# Patient Record
Sex: FEMALE | Race: WHITE | Employment: STUDENT | URBAN - METROPOLITAN AREA
[De-identification: names, ages, dates, MRNs, and addresses within clinical notes are randomized per-mention and may not be internally consistent; named-entity substitution may affect disease eponyms.]

---
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
## 2022-08-05 ENCOUNTER — OFFICE VISIT (OUTPATIENT)
Dept: INTERNAL MEDICINE CLINIC | Facility: CLINIC | Age: 22
End: 2022-08-05
Payer: COMMERCIAL

## 2022-08-05 VITALS
HEIGHT: 62.6 IN | DIASTOLIC BLOOD PRESSURE: 64 MMHG | RESPIRATION RATE: 16 BRPM | BODY MASS INDEX: 22.18 KG/M2 | WEIGHT: 123.63 LBS | OXYGEN SATURATION: 98 % | SYSTOLIC BLOOD PRESSURE: 102 MMHG | TEMPERATURE: 97 F | HEART RATE: 85 BPM

## 2022-08-05 DIAGNOSIS — Z85.43: ICD-10-CM

## 2022-08-05 DIAGNOSIS — F41.9 ANXIETY: ICD-10-CM

## 2022-08-05 DIAGNOSIS — Z15.09 MONOALLELIC MUTATION OF DICER1 GENE: ICD-10-CM

## 2022-08-05 DIAGNOSIS — Z00.00 ROUTINE GENERAL MEDICAL EXAMINATION AT A HEALTH CARE FACILITY: Primary | ICD-10-CM

## 2022-08-05 DIAGNOSIS — I45.81 LONG Q-T SYNDROME: ICD-10-CM

## 2022-08-05 DIAGNOSIS — Z15.89 MONOALLELIC MUTATION OF DICER1 GENE: ICD-10-CM

## 2022-08-05 PROBLEM — D27.0: Status: ACTIVE | Noted: 2017-09-23

## 2022-08-05 PROBLEM — E04.2 MULTIPLE THYROID NODULES: Status: ACTIVE | Noted: 2017-10-27

## 2022-08-05 PROCEDURE — 3078F DIAST BP <80 MM HG: CPT | Performed by: INTERNAL MEDICINE

## 2022-08-05 PROCEDURE — 3008F BODY MASS INDEX DOCD: CPT | Performed by: INTERNAL MEDICINE

## 2022-08-05 PROCEDURE — 3074F SYST BP LT 130 MM HG: CPT | Performed by: INTERNAL MEDICINE

## 2022-08-05 PROCEDURE — 99385 PREV VISIT NEW AGE 18-39: CPT | Performed by: INTERNAL MEDICINE

## 2022-08-08 ENCOUNTER — LAB REQUISITION (OUTPATIENT)
Dept: LAB | Facility: HOSPITAL | Age: 22
End: 2022-08-08
Payer: COMMERCIAL

## 2022-08-08 DIAGNOSIS — Z11.3 ENCOUNTER FOR SCREENING FOR INFECTIONS WITH A PREDOMINANTLY SEXUAL MODE OF TRANSMISSION: ICD-10-CM

## 2022-08-08 PROCEDURE — 87491 CHLMYD TRACH DNA AMP PROBE: CPT | Performed by: OBSTETRICS & GYNECOLOGY

## 2022-08-08 PROCEDURE — 87591 N.GONORRHOEAE DNA AMP PROB: CPT | Performed by: OBSTETRICS & GYNECOLOGY

## 2022-08-08 PROCEDURE — 87661 TRICHOMONAS VAGINALIS AMPLIF: CPT | Performed by: OBSTETRICS & GYNECOLOGY

## 2022-08-09 LAB
C TRACH DNA SPEC QL NAA+PROBE: NEGATIVE
N GONORRHOEA DNA SPEC QL NAA+PROBE: NEGATIVE

## 2022-08-11 LAB — TRICHOMONAS VAGINALIS BY TMA: NEGATIVE

## 2023-09-20 RX ORDER — NORETHINDRONE ACETATE AND ETHINYL ESTRADIOL AND FERROUS FUMARATE 1MG-20(21)
1 KIT ORAL DAILY
Qty: 84 TABLET | Refills: 0 | Status: SHIPPED | OUTPATIENT
Start: 2023-09-20

## 2023-10-03 ENCOUNTER — TELEPHONE (OUTPATIENT)
Dept: INTERNAL MEDICINE CLINIC | Facility: CLINIC | Age: 23
End: 2023-10-03

## 2023-10-03 DIAGNOSIS — Z13.29 SCREENING FOR THYROID DISORDER: ICD-10-CM

## 2023-10-03 DIAGNOSIS — Z13.228 SCREENING FOR METABOLIC DISORDER: ICD-10-CM

## 2023-10-03 DIAGNOSIS — Z13.0 SCREENING FOR BLOOD DISEASE: ICD-10-CM

## 2023-10-03 DIAGNOSIS — Z13.220 SCREENING FOR LIPID DISORDERS: ICD-10-CM

## 2023-10-03 DIAGNOSIS — Z00.00 ROUTINE GENERAL MEDICAL EXAMINATION AT A HEALTH CARE FACILITY: Primary | ICD-10-CM

## 2023-10-03 NOTE — TELEPHONE ENCOUNTER
Orders to    Imamnuel Sargent           Pt aware to get labs done no sooner than 2 weeks prior to the appt.   Pt aware to fast.  No call back required./    Future Appointments   Date Time Provider Manuela York   12/28/2023  1:20 PM Collin Felipe MD [facility]

## 2023-10-03 NOTE — TELEPHONE ENCOUNTER
Orders to    [facility]      Pt aware to get labs done no sooner than 2 weeks prior to the appt. Pt aware to fast.  No call back required.   Future Appointments   Date Time Provider Manuela York   12/28/2023  1:20 PM Demetrio Delaney MD [facility]

## 2023-12-23 ENCOUNTER — LAB ENCOUNTER (OUTPATIENT)
Dept: LAB | Age: 23
End: 2023-12-23
Attending: INTERNAL MEDICINE
Payer: COMMERCIAL

## 2023-12-23 LAB
ALBUMIN SERPL-MCNC: 4.3 G/DL (ref 3.2–4.8)
ALBUMIN/GLOB SERPL: 1.7 {RATIO} (ref 1–2)
ALP LIVER SERPL-CCNC: 56 U/L
ALT SERPL-CCNC: 74 U/L
ANION GAP SERPL CALC-SCNC: 10 MMOL/L (ref 0–18)
AST SERPL-CCNC: 35 U/L (ref ?–34)
BASOPHILS # BLD AUTO: 0.06 X10(3) UL (ref 0–0.2)
BASOPHILS NFR BLD AUTO: 0.8 %
BILIRUB SERPL-MCNC: 0.4 MG/DL (ref 0.3–1.2)
BUN BLD-MCNC: 8 MG/DL (ref 9–23)
BUN/CREAT SERPL: 11.9 (ref 10–20)
CALCIUM BLD-MCNC: 9.4 MG/DL (ref 8.7–10.4)
CHLORIDE SERPL-SCNC: 103 MMOL/L (ref 98–112)
CHOLEST SERPL-MCNC: 132 MG/DL (ref ?–200)
CO2 SERPL-SCNC: 26 MMOL/L (ref 21–32)
CREAT BLD-MCNC: 0.67 MG/DL
DEPRECATED RDW RBC AUTO: 40.7 FL (ref 35.1–46.3)
EGFRCR SERPLBLD CKD-EPI 2021: 127 ML/MIN/1.73M2 (ref 60–?)
EOSINOPHIL # BLD AUTO: 0.28 X10(3) UL (ref 0–0.7)
EOSINOPHIL NFR BLD AUTO: 3.8 %
ERYTHROCYTE [DISTWIDTH] IN BLOOD BY AUTOMATED COUNT: 12.4 % (ref 11–15)
FASTING PATIENT LIPID ANSWER: NO
FASTING STATUS PATIENT QL REPORTED: NO
GLOBULIN PLAS-MCNC: 2.6 G/DL (ref 2.8–4.4)
GLUCOSE BLD-MCNC: 87 MG/DL (ref 70–99)
HCT VFR BLD AUTO: 42.3 %
HDLC SERPL-MCNC: 51 MG/DL (ref 40–59)
HGB BLD-MCNC: 14.7 G/DL
IMM GRANULOCYTES # BLD AUTO: 0.01 X10(3) UL (ref 0–1)
IMM GRANULOCYTES NFR BLD: 0.1 %
LDLC SERPL CALC-MCNC: 58 MG/DL (ref ?–100)
LYMPHOCYTES # BLD AUTO: 1.98 X10(3) UL (ref 1–4)
LYMPHOCYTES NFR BLD AUTO: 26.6 %
MCH RBC QN AUTO: 31.4 PG (ref 26–34)
MCHC RBC AUTO-ENTMCNC: 34.8 G/DL (ref 31–37)
MCV RBC AUTO: 90.4 FL
MONOCYTES # BLD AUTO: 0.58 X10(3) UL (ref 0.1–1)
MONOCYTES NFR BLD AUTO: 7.8 %
NEUTROPHILS # BLD AUTO: 4.54 X10 (3) UL (ref 1.5–7.7)
NEUTROPHILS # BLD AUTO: 4.54 X10(3) UL (ref 1.5–7.7)
NEUTROPHILS NFR BLD AUTO: 60.9 %
NONHDLC SERPL-MCNC: 81 MG/DL (ref ?–130)
OSMOLALITY SERPL CALC.SUM OF ELEC: 286 MOSM/KG (ref 275–295)
PLATELET # BLD AUTO: 291 10(3)UL (ref 150–450)
POTASSIUM SERPL-SCNC: 4.3 MMOL/L (ref 3.5–5.1)
PROT SERPL-MCNC: 6.9 G/DL (ref 5.7–8.2)
RBC # BLD AUTO: 4.68 X10(6)UL
SODIUM SERPL-SCNC: 139 MMOL/L (ref 136–145)
TRIGL SERPL-MCNC: 130 MG/DL (ref 30–149)
TSI SER-ACNC: 1.01 MIU/ML (ref 0.55–4.78)
VLDLC SERPL CALC-MCNC: 19 MG/DL (ref 0–30)
WBC # BLD AUTO: 7.5 X10(3) UL (ref 4–11)

## 2023-12-23 PROCEDURE — 85025 COMPLETE CBC W/AUTO DIFF WBC: CPT | Performed by: INTERNAL MEDICINE

## 2023-12-23 PROCEDURE — 84443 ASSAY THYROID STIM HORMONE: CPT | Performed by: INTERNAL MEDICINE

## 2023-12-23 PROCEDURE — 80061 LIPID PANEL: CPT | Performed by: INTERNAL MEDICINE

## 2023-12-23 PROCEDURE — 80053 COMPREHEN METABOLIC PANEL: CPT | Performed by: INTERNAL MEDICINE

## 2023-12-23 PROCEDURE — 36415 COLL VENOUS BLD VENIPUNCTURE: CPT | Performed by: INTERNAL MEDICINE

## 2023-12-28 ENCOUNTER — OFFICE VISIT (OUTPATIENT)
Dept: INTERNAL MEDICINE CLINIC | Facility: CLINIC | Age: 23
End: 2023-12-28
Payer: COMMERCIAL

## 2023-12-28 VITALS
SYSTOLIC BLOOD PRESSURE: 104 MMHG | HEART RATE: 67 BPM | BODY MASS INDEX: 23 KG/M2 | DIASTOLIC BLOOD PRESSURE: 58 MMHG | OXYGEN SATURATION: 99 % | WEIGHT: 129 LBS | TEMPERATURE: 98 F

## 2023-12-28 DIAGNOSIS — I45.81 LONG Q-T SYNDROME: ICD-10-CM

## 2023-12-28 DIAGNOSIS — Z00.00 ROUTINE GENERAL MEDICAL EXAMINATION AT A HEALTH CARE FACILITY: Primary | ICD-10-CM

## 2023-12-28 DIAGNOSIS — R74.01 TRANSAMINITIS: ICD-10-CM

## 2023-12-28 DIAGNOSIS — Z23 NEED FOR INFLUENZA VACCINATION: ICD-10-CM

## 2023-12-28 DIAGNOSIS — Z15.89 MONOALLELIC MUTATION OF DICER1 GENE: ICD-10-CM

## 2023-12-28 DIAGNOSIS — Z12.4 PAP SMEAR FOR CERVICAL CANCER SCREENING: ICD-10-CM

## 2023-12-28 DIAGNOSIS — Z85.43: ICD-10-CM

## 2023-12-28 DIAGNOSIS — Z11.8 SCREENING FOR CHLAMYDIAL DISEASE: ICD-10-CM

## 2023-12-28 DIAGNOSIS — R10.10 UPPER ABDOMINAL PAIN: ICD-10-CM

## 2023-12-28 DIAGNOSIS — Z15.09 MONOALLELIC MUTATION OF DICER1 GENE: ICD-10-CM

## 2023-12-28 PROCEDURE — 90471 IMMUNIZATION ADMIN: CPT | Performed by: INTERNAL MEDICINE

## 2023-12-28 PROCEDURE — 87491 CHLMYD TRACH DNA AMP PROBE: CPT | Performed by: INTERNAL MEDICINE

## 2023-12-28 PROCEDURE — 87591 N.GONORRHOEAE DNA AMP PROB: CPT | Performed by: INTERNAL MEDICINE

## 2023-12-28 PROCEDURE — 90686 IIV4 VACC NO PRSV 0.5 ML IM: CPT | Performed by: INTERNAL MEDICINE

## 2023-12-28 PROCEDURE — 99395 PREV VISIT EST AGE 18-39: CPT | Performed by: INTERNAL MEDICINE

## 2023-12-28 PROCEDURE — 3078F DIAST BP <80 MM HG: CPT | Performed by: INTERNAL MEDICINE

## 2023-12-28 PROCEDURE — 3074F SYST BP LT 130 MM HG: CPT | Performed by: INTERNAL MEDICINE

## 2023-12-29 LAB
C TRACH DNA SPEC QL NAA+PROBE: NEGATIVE
N GONORRHOEA DNA SPEC QL NAA+PROBE: NEGATIVE

## 2024-01-26 ENCOUNTER — HOSPITAL ENCOUNTER (OUTPATIENT)
Dept: ULTRASOUND IMAGING | Age: 24
Discharge: HOME | End: 2024-01-26
Attending: INTERNAL MEDICINE
Payer: COMMERCIAL

## 2024-01-26 DIAGNOSIS — R10.10 UPPER ABDOMINAL PAIN: ICD-10-CM

## 2024-01-26 DIAGNOSIS — R74.01 TRANSAMINITIS: ICD-10-CM

## 2024-01-26 PROCEDURE — 76700 US EXAM ABDOM COMPLETE: CPT | Performed by: INTERNAL MEDICINE

## 2024-03-01 ENCOUNTER — OFFICE VISIT (OUTPATIENT)
Facility: CLINIC | Age: 24
End: 2024-03-01
Payer: COMMERCIAL

## 2024-03-01 VITALS
HEART RATE: 67 BPM | SYSTOLIC BLOOD PRESSURE: 112 MMHG | BODY MASS INDEX: 22.72 KG/M2 | HEIGHT: 62.6 IN | WEIGHT: 126.63 LBS | DIASTOLIC BLOOD PRESSURE: 72 MMHG

## 2024-03-01 DIAGNOSIS — Z12.4 SCREENING FOR CERVICAL CANCER: ICD-10-CM

## 2024-03-01 DIAGNOSIS — Z15.09 MONOALLELIC MUTATION OF DICER1 GENE: ICD-10-CM

## 2024-03-01 DIAGNOSIS — Z15.89 MONOALLELIC MUTATION OF DICER1 GENE: ICD-10-CM

## 2024-03-01 DIAGNOSIS — M62.89 PELVIC FLOOR DYSFUNCTION IN FEMALE: ICD-10-CM

## 2024-03-01 DIAGNOSIS — N89.8 VAGINAL DISCHARGE: ICD-10-CM

## 2024-03-01 DIAGNOSIS — F43.9 STRESS: ICD-10-CM

## 2024-03-01 DIAGNOSIS — Z30.09 GENERAL COUNSELING AND ADVICE FOR CONTRACEPTIVE MANAGEMENT: ICD-10-CM

## 2024-03-01 DIAGNOSIS — Z85.43: ICD-10-CM

## 2024-03-01 DIAGNOSIS — Z01.411 ENCOUNTER FOR WELL WOMAN EXAM WITH ABNORMAL FINDINGS: Primary | ICD-10-CM

## 2024-03-01 DIAGNOSIS — R79.89 ELEVATED LFTS: ICD-10-CM

## 2024-03-01 PROBLEM — I45.81 LONG QT SYNDROME: Status: ACTIVE | Noted: 2018-05-17

## 2024-03-01 PROCEDURE — 99385 PREV VISIT NEW AGE 18-39: CPT | Performed by: OBSTETRICS & GYNECOLOGY

## 2024-03-01 PROCEDURE — 99203 OFFICE O/P NEW LOW 30 MIN: CPT | Performed by: OBSTETRICS & GYNECOLOGY

## 2024-03-01 RX ORDER — DROSPIRENONE 4 MG/1
1 TABLET, FILM COATED ORAL DAILY
Qty: 84 TABLET | Refills: 3 | Status: SHIPPED | OUTPATIENT
Start: 2024-03-01 | End: 2024-03-29

## 2024-03-01 NOTE — H&P
[facility]  Obstetrics and Gynecology   History & Physical  NEW    Chief complaint:   Chief Complaint   Patient presents with    Wellness Visit     No pap history  Wants to discuss birth control options        Subjective:     HPI: Yina Horan is a 23 year old  with Patient's last menstrual period was 2024 (exact date).   Here for WWE, discuss contraception options. She would like to minimize her risk of future cancers.      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     Stage 1 - oopherectomy . Nodes clear. No chemo/radiation. 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 Has seen Genetic Counselor     PCP: Adilia Rainey MD     Review of Systems   Constitutional:  Positive for unexpected weight change.        Used to be around 120 lb.   Some stress at school. Some emotional eating.   Sometimes not enough. Sometimes forget.   Working at the [employer] in [employer]  Exercise - not much lately due to working a lot  Diet - unrestricted    Eyes:  Negative for visual disturbance.   Respiratory:  Negative for cough and shortness of breath.    Cardiovascular:         Long QT syndrome- sees cardiology from [facility], doing well on nadolol   Gastrointestinal:  Positive for constipation. Negative for blood in stool and diarrhea.        Occasional constipation since surgery to remove ovary.     Labs with mild elevation in LFTs, pt admits to drinking alcohol for the holidays. Does get episodic upper abdominal pains that resolve on their own, no vomiting but sometimes nausea with the pain    Has been diagnosed with gallstones. Still has gallbladder.    Genitourinary:  Negative for dyspareunia,  dysuria, frequency, hematuria, menstrual problem, pelvic pain and vaginal discharge.        Was on OCP for 2 years but ran out around 2023, no problems on it and would like it to be prescribed again.     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   -usually goes now only 1 time per year    No leaking urine   Has always had more vaginal discharge. No malodor. Occasional itch.     Breasts - no issues.    Neurological:  Negative for headaches.   Psychiatric/Behavioral:  Positive for sleep disturbance. Negative for dysphoric mood. The patient is not nervous/anxious.         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   Therapist - N. Would like to talk to someone.     Tendency to sleep deprivation.   Good sleeper     GYN Hx:   Menarche 16  Irregular menses x moderate flow x 6 days x no cramping.     Started OCP for contraception purposes - around 20 to 21   -Junel Fe - not sure of exact dose. Would not get withdrawal bleed on blanks but would have bad cramping.     Currently not on anything - currently monthly x normal to heavier x moderate cramping. Not needing medicine but does have a higher pain tolerance. Pushes through, can function.     Patient's last menstrual period was 2024 (exact date).  Pap Result Notes: no pap history    Boyfriend 2 years.   Sexually active? Y  Dyspareunia? N  Postcoital bleeding? N    Contraception: abstinent since running out of OCP  STDs: N  HPV vaccine Y    Cervical cancer screening:   Pap N     Breast cancer screening:  Mammogram: N    Colon cancer screening:  Colonoscopy N    OB History:  OB History    Para Term  AB Living   0 0 0 0 0 0   SAB IAB Ectopic Multiple Live Births   0 0 0 0 0     OB History    Para Term  AB Living   0 0 0 0 0 0   SAB  IAB Ectopic Multiple Live Births   0 0 0 0 0       Meds:  Current Outpatient Medications on File Prior to Visit   Medication Sig Dispense Refill    nadolol 20 MG Oral Tab   2     No current facility-administered medications on file prior to visit.       All:  Allergies   Allergen Reactions    Seasonal        PMH:  Past Medical History:   Diagnosis Date    Allergic rhinitis     Anxiety 08/05/2022    Asthma (HCC)     childhood    Bronchial cyst 2004    Dysmenorrhea     Elevated LFTs 03/01/2024    Gallstones     History of primary malignant neoplasm of right ovary 08/05/2022    Long QT syndrome 05/17/2018    Phenotype positive (multiple ECGs with QTc > 470 ms; see August 2018 for an example)  Genotype positive (SCN5A [id number] p.[id number], heterozygous, likely pathologic variant)  Started on beta blockers in August 2018    Lung cyst 01/01/2003    Diagnosed at 3 year of age. Surgically resected at [facility]. Benign per report    Malignant neoplasm of right ovary (HCC) 02/20/2018    Stage 1 - oopherectomy 9/17. Nodes clear. No chemo/radiation.  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 Will do Brain CT in future.  Next follow with [facility] 4/18. 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 Omentum with chronic inflammation, negative for tumor. Pericaval lymph node x 1 negative for tumor. Peritoneal fluid negative for tumor.    Wears glasses     driving, studying       PSH:  Past Surgical History:   Procedure  Laterality Date    FINE NEEDLE ASPIR,SUPERFICIAL Bilateral 04/05/2021    Bilateral thyroid nodules - benign follicular nodules    OOPHORECTOMY Right 2017    Sertoli Leydig cell tumor right ovary    OTHER SURGICAL HISTORY  2004    Removal bronchial cyst Per NG surgery at [facility] by Dr Lee       Social History:  Social History     Socioeconomic History    Marital status: Single   Tobacco Use    Smoking status: Some Days     Types: Cigarettes    Smokeless tobacco: Never    Tobacco comments:     1 cig every few months. Rare.    Vaping Use    Vaping Use: Never used   Substance and Sexual Activity    Alcohol use: Not Currently     Comment: occasional, social situations.    Drug use: Never    Sexual activity: Yes     Partners: Male     Birth control/protection: Condom   Other Topics Concern    Caffeine Concern No    Exercise No    Seat Belt No    Special Diet No    Stress Concern Yes     Comment: College student, and experienced 3 family deaths past 6 freddy    Weight Concern No        Family History:  Family History   Problem Relation Age of Onset    Genetic Disease Mother         Carrier DICER1 mutation    Other (Prolonged QT syndrome) Mother     Thyroid disease Mother         Grave's disease    No Known Problems Father         Not much known about this side of family    Genetic Disease Sister         Carrier DICER1 mutation    Other (Prolonged QT syndrome) Sister     No Known Problems Sister     No Known Problems Brother     Diabetes Maternal Grandfather     Hypertension Maternal Grandfather     Prostate Cancer Maternal Grandfather     Stroke Maternal Grandfather     Ovarian Cancer Maternal Aunt 18    Thyroid Cancer Maternal Aunt         30s    Breast Cancer Other     Breast Cancer Other     Heart Disorder Neg     Lipids Neg     Obesity Neg     Psychiatric Neg     Colon Cancer Neg     Birth Defects Neg     Infertility Neg     Endometriosis Neg     Blood Clotting Disorder Neg        Immunization  History:  Immunization History   Administered Date(s) Administered    Covid-19 Vaccine Pfizer 30 mcg/0.3 ml 07/06/2021, 07/27/2021    DTAP 01/17/2003, 03/28/2003, 05/21/2003, 05/24/2004, 09/13/2007    DTAP INFANRIX 09/13/2007    FLULAVAL 6 months & older 0.5 ml Prefilled syringe (11054) 10/15/2020    FLUZONE 6 months and older PFS 0.5 ml (75971) 01/04/2016, 10/06/2016, 09/26/2017, 09/11/2019, 10/15/2020, 11/22/2021, 12/28/2023    HEP A,Ped/Adol,(2 Dose) 10/06/2016, 02/15/2017    HEP B 01/17/2003, 03/28/2003, 05/24/2004    HIB 01/17/2003, 03/28/2003, 05/24/2004    Hpv Virus Vaccine 9 Kenna Im 02/15/2017, 05/16/2018    IPV 01/17/2003, 03/28/2003, 05/24/2004, 09/13/2007    Influenza 10/18/2011    MMR 11/20/2003, 09/13/2007, 09/13/2007    Meningococcal-Menactra 07/14/2014    Meningococcal-Menveo 2month-55yr 10/15/2020    Pneumococcal Vaccine, Conjugate 01/17/2003, 03/28/2003, 05/21/2003, 05/24/2004    TDAP 07/14/2014    Varicella 11/20/2003, 04/19/2012       Depression Scale      PHQ-2 SCORE: 0  , done 3/1/2024         Objective:     Vitals:    03/01/24 1437   BP: 112/72   Pulse: 67   Weight: 126 lb 9.6 oz (57.4 kg)   Height: 62.6\"       Body mass index is 22.71 kg/m².    Physical Exam:  Physical Exam  Vitals and nursing note reviewed.   Constitutional:       Appearance: Normal appearance.   HENT:      Head: Normocephalic and atraumatic.        Comments: Small erythematous patches. Patient reports has been diagnosed with rosacea  Eyes:      Extraocular Movements: Extraocular movements intact.      Conjunctiva/sclera: Conjunctivae normal.   Neck:      Comments: No thyromegaly  Cardiovascular:      Rate and Rhythm: Normal rate and regular rhythm.      Heart sounds: No murmur heard.  Pulmonary:      Effort: Pulmonary effort is normal.      Breath sounds: Normal breath sounds.      Comments: Breasts: no masses, no axillary lymphadenopathy  Abdominal:      General: There is no distension.      Palpations: Abdomen is soft.  There is no mass.      Tenderness: There is no abdominal tenderness. There is no guarding or rebound.      Hernia: No hernia is present.          Comments: Low midline & periumbilical well healed scar   Genitourinary:     Comments: VULVA: normal appearing vulva with no masses, tenderness or lesions  URETHRA: unremarkable   PERINEUM: intact  VAGINA: normal appearing vagina with normal color and discharge, no lesions   CERVIX: normal appearing cervix without discharge or lesions  UTERUS: uterus is normal size, shape, consistency and nontender  ADNEXA: normal adnexa in size, nontender and no masses  PELVIC FLOOR: mod hypertonicity, mild tenderness       Neurological:      General: No focal deficit present.      Mental Status: She is alert.      Cranial Nerves: No cranial nerve deficit.   Psychiatric:         Mood and Affect: Mood normal.         Behavior: Behavior normal.         Thought Content: Thought content normal.         Judgment: Judgment normal.         Labs:  Lab Results   Component Value Date    WBC 7.5 12/23/2023    RBC 4.68 12/23/2023    HGB 14.7 12/23/2023    HCT 42.3 12/23/2023    MCV 90.4 12/23/2023    MCH 31.4 12/23/2023    MCHC 34.8 12/23/2023    RDW 12.4 12/23/2023    .0 12/23/2023    MPV 7.1 (L) 09/22/2017        Lab Results   Component Value Date    GLU 87 12/23/2023    BUN 8 (L) 12/23/2023    BUNCREA 11.9 12/23/2023    CREATSERUM 0.67 12/23/2023    ANIONGAP 10 12/23/2023    CA 9.4 12/23/2023    OSMOCALC 286 12/23/2023    ALKPHO 56 12/23/2023    AST 35 (H) 12/23/2023    ALT 74 (H) 12/23/2023    BILT 0.4 12/23/2023    TP 6.9 12/23/2023    ALB 4.3 12/23/2023    GLOBULIN 2.6 (L) 12/23/2023     12/23/2023    K 4.3 12/23/2023     12/23/2023    CO2 26.0 12/23/2023       Lab Results   Component Value Date    CHOLEST 132 12/23/2023    TRIG 130 12/23/2023    HDL 51 12/23/2023    LDL 58 12/23/2023    VLDL 19 12/23/2023    NONHDLC 81 12/23/2023        Lab Results   Component Value Date     T4F 0.74 02/15/2017    TSH 1.015 2023        No results found for: \"EAG\", \"A1C\"    Imaging:  US ABDOMEN COMPLETE (CPT=76700)    Result Date: 2024  CONCLUSION:  1. Extensive cholelithiasis without sonographic evidence of acute cholecystitis.  2. Negative for hepatobiliary dilatation.  3. Sonographic features of hepatic steatosis.   4. Lesser incidental findings as above.    Dictated by (CST): Rustam Price MD on 2024 at 1:31 PM     Finalized by (CST): Rustam Price MD on 2024 at 1:33 PM            Assessment:     Yina Horan is a 23 year old  female here for WWE, discuss contraception options. H/o right oophorectomy for Sertoli Leydig tumor. Carrier mutation in DICER1 making her at increased risk for various cancers.      Diagnoses and all orders for this visit:    Encounter for well woman exam with abnormal findings    Screening for cervical cancer  -     ThinPrep PAP with HPV Reflex Request B; Future  -     Image-Guided Pap Smear ([facility])    General counseling and advice for contraceptive management    History of primary malignant neoplasm of right ovary  -     Drospirenone (SLYND) 4 MG Oral Tab; Take 1 tablet by mouth daily for 28 days.    Monoallelic mutation of DICER1 gene  -     Drospirenone (SLYND) 4 MG Oral Tab; Take 1 tablet by mouth daily for 28 days.    Elevated LFTs  -     Drospirenone (SLYND) 4 MG Oral Tab; Take 1 tablet by mouth daily for 28 days.    Vaginal discharge  -     Vaginitis Vaginosis PCR Panel; Future    Pelvic floor dysfunction in female  -     OP REFERRAL TO [facility]    Stress  -      NAVIGATOR         Plan:     Pap done 3/1/2024     Breast exam benign 3/1/24  HPV vaccine done     DICER mutation  -at higher risk of cancers  -reviewed estrogen containing hormonal contraceptives could potentially stimulate an estrogen responsive tumor  -reviewed also that OCPs have been noted to decrease the risk of fallopian tube, ovarian, and  colon cancers.     Contraception   -condoms currently. Interested in OCP. Advise avoiding estrogen given h/o elevated LFTs & gallstones.   -not interested in implant or IUD  -will try progestin only OCP Sly[facility].     STD screening: GC/CT neg 12/28/23 with PCP. Declines testing today. Has been with same partner for 2 years.     Mammogram NA  Colonoscopy NA     BMI normal   -healthy diet & exercise encouraged    Vaginal discharge  -BD molecular swab collected    Stress  - navigator ordered     RTC 1 year for WWE after 3/1/25    Manuela Vizcarra MD  [facility] - OBGYN    Note to patient and family:  The 21st Century Cures Act makes medical notes available to patients in the interest of transparency.  However, please be advised that this is a medical document.  It is intended as a peer to peer communication.  It is written in medical language and may contain abbreviations or verbiage that are technical and unfamiliar.  It may appear blunt or direct.  Medical documents are intended to carry relevant information, facts as evident, and the clinical opinion of the practitioner.

## 2024-03-01 NOTE — PATIENT INSTRUCTIONS
[facility]  [address]  [place] [address]  P [phone]  F [phone]  *Local mail order pharmacy that will give reasonable cash-pay prices for pills that may not be covered by insurance (e.g. Slynd, Nextellis)       [facility]    [address], [address], [place] [address]. Ph: [phone]  [address], [address]nd [place] [address]. Ph: [phone] & [phone]  [address], [place] [address]. Ph [phone]  [address], [place] [address]. Ph [phone]  [address] [place] [address]. Ph [phone]  [address] [place] [address]. Ph [phone]    The Role of Physical Therapy in the Treatment of Pelvic Floor Dysfunction:    Physical therapists are trained to evaluate and treat dysfunctions in the joints, muscles, nerves and scar. Physical therapists specifically trained in the area of pelvic health can identify the possible musculoskeletal causes of pelvic pain, bladder and bowel difficulties and develop a treatment plan specific to the individual suffering from this difficulties.     What to expect at your first physical therapy appointment:   Your first visit will include an initial evaluation in a comfortable, private room by a therapist who has undergone advanced education and training in the evaluation and treatment of pelvic muscle dysfunction. The therapist will obtain a detailed history of your health, pain and activity limitations. She will also ask you about any bowel, bladder and sexual difficulties as these are in part controlled by the pelvic muscles. The therapist will then take a look at your posture, mobility of your spine and hips and strength and flexibility of pelvic girdle muscles. She will examine any scar tissue and trigger points in the muscles of your pelvic region.     The therapist will also specifically examine the pelvic floor muscles. Your pelvic floor consists of a  group of muscles that attach behind the pubic bone in the front to the tail bone in the back. They are responsible for providing support to the pelvic joints and organs, relaxing to allow the passage of urine, stool and gas and mary to prevent the loss of urine, stool and gas as appropriate. In order to best examine these muscles you will be asked to undress from the waist down and be covered with a sheet. The therapist will use a lubricated, gloved finger to identify painful muscles around and in your vagina or rectum then instruct you to contract and relax these muscles in order to determine how the muscles are functioning. Care is taken to make you as comfortable as possible with the exam.     Your therapist will discuss the evaluation results with you and provide you with education regarding your specific condition and the expectation of therapy. She will answer all of your questions and will work with you to establish a treatment plan based on the results of the evaluation and your goals for therapy.    Lubricants  Aloe Cadabra  Good Clean Love  Pre-Seed (targeted for those attempting to conceive)   Restore  *Lubricants that are hypo-osmolar or iso-osmolar are preferable to hyper-osmolar formulations.     [facility] (used to be [facility])     Mental Health Resources  Call 911 or seek the nearest Emergency Department if you have thoughts of self harm or harm to others with a plan.      1.GAIL Call or text the GAIL Helpline at 799-816-1891,  or chat with us,  M-F, 10 a.m. - 10 p.m. ET.   In a crisis? Call or text 713.  Support group  HAY GRUPOS DE APOYO DISPONIBLES EN TU  COMUNIDAD CERCA DE TI (O POR INTERNET).  A través de los E.E.U.U., hay 49 organizaciones  estatales y 648 afiliadas locales asociadas con GAIL,  ofreciendo programas gratis, de calidad y guiados por  pares a cientos de miles de personas cada año.  GAIL Connection es un aquiles de apoyo semanal o bisemanal  para personas con  enfermedades de donna mental.  GAIL Family Support Group es un aquiles de apoyo semanal o  mensual para amistades, parejas, o familiares de personas  con enfermedades de donna mental.  Explore GAIL support groups available in your community:  visit GAIL.[email or web address]/SupportGroups  (Ambos grupos están disponibles en español en  localidades particulares. )     2. You may reach out to [facility] at [phone], they offer 24-hour resource and referral for depression and support. There is a specific Mom's Line at 060-529-4850723.788.5793 988 Suicide and Crisis Lifeline (24/7)     National Suicide Prevention Hotline (24/7) - 976-879-LIWF (8785)     National Crisis Text Line (24/7) - Text HELLO to 051631 - https://www.crisistextline.org     Mafj5Qjnm Text line (24/7) - Text TALK to 877228 or WILTON (for Uzbek)     [place] Namx5Zeio Text line - specifically for individuals struggling with stress related to the COVID-19 pandemic and in need of emotional support. Anonymous (only provide zip code) to connect to local resources     Warm Line - 615.353.9743 - (Mon-Fri, 8am - 5pm)  [place] Warm Line - if you or one of your family members has a mental health and or substance use challenges and would like support by phone. Wellness support specialists are professionals who have experienced MH or substance use recovery in their own lives. Not crisis support. , but provide recovery support, mentoring and advocacy.     CARES Line (Crisis and Referral Entry Services) (24/7) - [phone] - Children, youth and families  [facility] - 24/7 crisis line if your child is a risk to themselves or others, having a mental health crisis or you would like a referral to services for children, youth and families.  Provides support and resources to individuals and families experiencing mental illness         [facility]  [address], [place] [address]  Phone: [phone]    [facility]  [address] [place] [address]  [phone]     [facility]   [address] [facility] [address]  [phone]     [facility]  [address], [place] [address]  [phone]     [facility]  (multiple locations in the area)  [phone]

## 2024-03-04 PROCEDURE — 88175 CYTOPATH C/V AUTO FLUID REDO: CPT | Performed by: OBSTETRICS & GYNECOLOGY

## 2024-03-04 PROCEDURE — 81514 NFCT DS BV&VAGINITIS DNA ALG: CPT | Performed by: OBSTETRICS & GYNECOLOGY

## 2024-03-06 ENCOUNTER — TELEPHONE (OUTPATIENT)
Age: 24
End: 2024-03-06

## 2024-03-06 LAB
BV BACTERIA DNA VAG QL NAA+PROBE: NEGATIVE
C GLABRATA DNA VAG QL NAA+PROBE: NEGATIVE
C KRUSEI DNA VAG QL NAA+PROBE: NEGATIVE
CANDIDA DNA VAG QL NAA+PROBE: NEGATIVE
T VAGINALIS DNA VAG QL NAA+PROBE: NEGATIVE

## 2024-03-06 NOTE — TELEPHONE ENCOUNTER
Kimberly Martin LCSW  [place]  [place]. [address]  Phone: [phone]    Pam Ha  [address]  [place] [address]  Phone: [phone]    Elaine Mendieta LCPC  [facility] [place] [address]  Phone: [phone]    Anjana Herr  [facility] [place] [address]  Phone: [phone]

## 2024-03-07 LAB
.: NORMAL
.: NORMAL

## 2025-05-27 ENCOUNTER — TELEPHONE (OUTPATIENT)
Facility: CLINIC | Age: 25
End: 2025-05-27

## 2025-05-27 ENCOUNTER — HOSPITAL ENCOUNTER (EMERGENCY)
Facility: HOSPITAL | Age: 25
Discharge: HOME | End: 2025-05-27
Attending: EMERGENCY MEDICINE
Payer: COMMERCIAL

## 2025-05-27 ENCOUNTER — APPOINTMENT (OUTPATIENT)
Dept: ULTRASOUND IMAGING | Facility: HOSPITAL | Age: 25
End: 2025-05-27
Attending: EMERGENCY MEDICINE
Payer: COMMERCIAL

## 2025-05-27 ENCOUNTER — NURSE TRIAGE (OUTPATIENT)
Dept: INTERNAL MEDICINE CLINIC | Facility: CLINIC | Age: 25
End: 2025-05-27

## 2025-05-27 ENCOUNTER — HOSPITAL ENCOUNTER (OUTPATIENT)
Age: 25
Discharge: ANOTHER HOSPITAL | End: 2025-05-27
Attending: EMERGENCY MEDICINE
Payer: COMMERCIAL

## 2025-05-27 VITALS
WEIGHT: 120 LBS | TEMPERATURE: 99 F | RESPIRATION RATE: 18 BRPM | SYSTOLIC BLOOD PRESSURE: 105 MMHG | OXYGEN SATURATION: 98 % | BODY MASS INDEX: 22 KG/M2 | HEART RATE: 62 BPM | DIASTOLIC BLOOD PRESSURE: 88 MMHG

## 2025-05-27 VITALS
OXYGEN SATURATION: 100 % | SYSTOLIC BLOOD PRESSURE: 96 MMHG | RESPIRATION RATE: 16 BRPM | HEART RATE: 66 BPM | TEMPERATURE: 98 F | DIASTOLIC BLOOD PRESSURE: 55 MMHG

## 2025-05-27 DIAGNOSIS — R10.13 EPIGASTRIC PAIN: Primary | ICD-10-CM

## 2025-05-27 DIAGNOSIS — Z15.89 MONOALLELIC MUTATION OF DICER1 GENE: ICD-10-CM

## 2025-05-27 DIAGNOSIS — K80.20 CALCULUS OF GALLBLADDER WITHOUT CHOLECYSTITIS WITHOUT OBSTRUCTION: ICD-10-CM

## 2025-05-27 DIAGNOSIS — R79.89 ELEVATED LFTS: ICD-10-CM

## 2025-05-27 DIAGNOSIS — K80.50 BILIARY COLIC: ICD-10-CM

## 2025-05-27 DIAGNOSIS — Z15.09 MONOALLELIC MUTATION OF DICER1 GENE: ICD-10-CM

## 2025-05-27 DIAGNOSIS — Z85.43: ICD-10-CM

## 2025-05-27 LAB
ALBUMIN SERPL-MCNC: 4.2 G/DL (ref 3.2–4.8)
ALP LIVER SERPL-CCNC: 95 U/L (ref 52–144)
ALT SERPL-CCNC: 306 U/L (ref 10–49)
ANION GAP SERPL CALC-SCNC: 7 MMOL/L (ref 0–18)
AST SERPL-CCNC: 97 U/L (ref ?–34)
B-HCG UR QL: NEGATIVE
BASOPHILS # BLD AUTO: 0.03 X10(3) UL (ref 0–0.2)
BASOPHILS NFR BLD AUTO: 0.3 %
BILIRUB DIRECT SERPL-MCNC: 0.2 MG/DL (ref ?–0.3)
BILIRUB SERPL-MCNC: 0.6 MG/DL (ref 0.3–1.2)
BILIRUB UR QL STRIP: NEGATIVE
BUN BLD-MCNC: 14 MG/DL (ref 9–23)
BUN/CREAT SERPL: 16.5 (ref 10–20)
CALCIUM BLD-MCNC: 9 MG/DL (ref 8.7–10.4)
CHLORIDE SERPL-SCNC: 106 MMOL/L (ref 98–112)
CO2 SERPL-SCNC: 25 MMOL/L (ref 21–32)
CREAT BLD-MCNC: 0.85 MG/DL (ref 0.55–1.02)
DEPRECATED RDW RBC AUTO: 42.7 FL (ref 35.1–46.3)
EGFRCR SERPLBLD CKD-EPI 2021: 99 ML/MIN/1.73M2 (ref 60–?)
EOSINOPHIL # BLD AUTO: 0.24 X10(3) UL (ref 0–0.7)
EOSINOPHIL NFR BLD AUTO: 2.3 %
ERYTHROCYTE [DISTWIDTH] IN BLOOD BY AUTOMATED COUNT: 12.5 % (ref 11–15)
GLUCOSE BLD-MCNC: 110 MG/DL (ref 70–99)
GLUCOSE UR STRIP-MCNC: NEGATIVE MG/DL
HCT VFR BLD AUTO: 38.9 % (ref 35–48)
HGB BLD-MCNC: 13.2 G/DL (ref 12–16)
HGB UR QL STRIP: NEGATIVE
IMM GRANULOCYTES # BLD AUTO: 0.03 X10(3) UL (ref 0–1)
IMM GRANULOCYTES NFR BLD: 0.3 %
KETONES UR STRIP-MCNC: NEGATIVE MG/DL
LEUKOCYTE ESTERASE UR QL STRIP: NEGATIVE
LIPASE SERPL-CCNC: 214 U/L (ref 12–53)
LYMPHOCYTES # BLD AUTO: 1.98 X10(3) UL (ref 1–4)
LYMPHOCYTES NFR BLD AUTO: 19.4 %
MCH RBC QN AUTO: 31.3 PG (ref 26–34)
MCHC RBC AUTO-ENTMCNC: 33.9 G/DL (ref 31–37)
MCV RBC AUTO: 92.2 FL (ref 80–100)
MONOCYTES # BLD AUTO: 0.76 X10(3) UL (ref 0.1–1)
MONOCYTES NFR BLD AUTO: 7.4 %
NEUTROPHILS # BLD AUTO: 7.18 X10 (3) UL (ref 1.5–7.7)
NEUTROPHILS # BLD AUTO: 7.18 X10(3) UL (ref 1.5–7.7)
NEUTROPHILS NFR BLD AUTO: 70.3 %
NITRITE UR QL STRIP: NEGATIVE
OSMOLALITY SERPL CALC.SUM OF ELEC: 287 MOSM/KG (ref 275–295)
PH UR STRIP: 6 [PH]
PLATELET # BLD AUTO: 315 10(3)UL (ref 150–450)
POTASSIUM SERPL-SCNC: 3.8 MMOL/L (ref 3.5–5.1)
PROT SERPL-MCNC: 6.7 G/DL (ref 5.7–8.2)
PROT UR STRIP-MCNC: NEGATIVE MG/DL
RBC # BLD AUTO: 4.22 X10(6)UL (ref 3.8–5.3)
SODIUM SERPL-SCNC: 138 MMOL/L (ref 136–145)
SP GR UR STRIP: 1.02
UROBILINOGEN UR STRIP-ACNC: <2 MG/DL
WBC # BLD AUTO: 10.2 X10(3) UL (ref 4–11)

## 2025-05-27 PROCEDURE — 85025 COMPLETE CBC W/AUTO DIFF WBC: CPT | Performed by: EMERGENCY MEDICINE

## 2025-05-27 PROCEDURE — 99285 EMERGENCY DEPT VISIT HI MDM: CPT

## 2025-05-27 PROCEDURE — 80048 BASIC METABOLIC PNL TOTAL CA: CPT | Performed by: EMERGENCY MEDICINE

## 2025-05-27 PROCEDURE — 81002 URINALYSIS NONAUTO W/O SCOPE: CPT

## 2025-05-27 PROCEDURE — 81025 URINE PREGNANCY TEST: CPT

## 2025-05-27 PROCEDURE — 99213 OFFICE O/P EST LOW 20 MIN: CPT

## 2025-05-27 PROCEDURE — 83690 ASSAY OF LIPASE: CPT | Performed by: EMERGENCY MEDICINE

## 2025-05-27 PROCEDURE — 36415 COLL VENOUS BLD VENIPUNCTURE: CPT

## 2025-05-27 PROCEDURE — 76705 ECHO EXAM OF ABDOMEN: CPT | Performed by: EMERGENCY MEDICINE

## 2025-05-27 PROCEDURE — 80076 HEPATIC FUNCTION PANEL: CPT | Performed by: EMERGENCY MEDICINE

## 2025-05-27 RX ORDER — MAGNESIUM HYDROXIDE/ALUMINUM HYDROXICE/SIMETHICONE 120; 1200; 1200 MG/30ML; MG/30ML; MG/30ML
30 SUSPENSION ORAL ONCE
Status: COMPLETED | OUTPATIENT
Start: 2025-05-27 | End: 2025-05-27

## 2025-05-27 RX ORDER — DROSPIRENONE 4 MG/1
1 TABLET, FILM COATED ORAL DAILY
Qty: 84 TABLET | Refills: 3 | Status: CANCELLED | OUTPATIENT
Start: 2025-05-27 | End: 2026-06-24

## 2025-05-27 RX ORDER — MAGNESIUM HYDROXIDE/ALUMINUM HYDROXICE/SIMETHICONE 120; 1200; 1200 MG/30ML; MG/30ML; MG/30ML
10 SUSPENSION ORAL 4 TIMES DAILY PRN
Qty: 710 ML | Refills: 0 | Status: SHIPPED | OUTPATIENT
Start: 2025-05-27

## 2025-05-27 RX ORDER — PANTOPRAZOLE SODIUM 40 MG/1
40 TABLET, DELAYED RELEASE ORAL DAILY
Qty: 30 TABLET | Refills: 0 | Status: SHIPPED | OUTPATIENT
Start: 2025-05-27 | End: 2025-06-26

## 2025-05-27 RX ORDER — FAMOTIDINE 10 MG/ML
20 INJECTION, SOLUTION INTRAVENOUS ONCE
Status: COMPLETED | OUTPATIENT
Start: 2025-05-27 | End: 2025-05-27

## 2025-05-27 NOTE — ED INITIAL ASSESSMENT (HPI)
Luq abd pain for few months, worse in the past 2 days, had 1x emesis yesterday, + urinary frequency, mild mid back pain, no fever

## 2025-05-27 NOTE — TELEPHONE ENCOUNTER
Action Requested: Summary for Provider     []  Critical Lab, Recommendations Needed  [] Need Additional Advice  []   FYI    []   Need Orders  [] Need Medications Sent to Pharmacy  []  Other     SUMMARY: Patient called stating that yesterday she was vomiting and having upper left abdominal pain. Patient describes pain as moderate. Last BM was 5/26/25 and was small stool. Denies blood in stool. Patient also states urinary frequency. Patient states pain as moderate. Denies fever. Patient has not taken any medication for pain. Has been eating a lighter diet of soup and fruit. Advised patient to go to ED/IC, patient verbalizes understanding. States that she will go to ED near her.     Reason for call: Abdominal Pain  Onset: 5/26/25                     Reason for Disposition   MILD TO MODERATE constant pain lasting > 2 hours    Protocols used: Abdominal Pain - Upper-A-OH

## 2025-05-27 NOTE — ED INITIAL ASSESSMENT (HPI)
Pt presents to ed with c/o abdominal pain. Pt states she had n/v yesterday that has since resolved and epigastric pain that has not improved. +constipation    No pain meds pta

## 2025-05-27 NOTE — ED PROVIDER NOTES
Patient Seen in: [facility] [place]       The following individual(s) verbally consented to be recorded using ambient AI listening technology and understand that they can each withdraw their consent to this listening technology at any point by asking the clinician to turn off or pause the recording:    Patient name: Yina Horan      History  Chief Complaint   Patient presents with    Abdominal Pain     Stated Complaint: Abdominal pain, vomiting    Subjective:   HPI     Yina Horan is a 24 year old female with history of ovarian cancer and long QT syndrome who presents with stomach pain.    She has been experiencing stomach pain for a few months, primarily occurring after meals. The pain has worsened over the past two days, becoming constant and more severe. It is located in the middle of the gastric area and sometimes radiates to the left side. She finds it more comfortable to sleep on her right side.    She experienced vomiting for the first time yesterday, although she often feels nauseous after meals. She also reports constipation. No fever.    She mentioned a family history of gallbladder stones and has been more cautious with her diet since discussing her symptoms with her gynecologist last year.        Objective:     Past Medical History:    Allergic rhinitis    Anxiety    Asthma (HCC)    childhood    Bronchial cyst    Dysmenorrhea    Elevated LFTs    Gallstones    History of primary malignant neoplasm of right ovary    Long QT syndrome    Phenotype positive (multiple ECGs with QTc > 470 ms; see August 2018 for an example)  Genotype positive (SCN5A [id number] [id number], heterozygous, likely pathologic variant)  Started on beta blockers in August 2018    Lung cyst    Diagnosed at 3 year of age. Surgically resected at [facility]. Benign per report    Malignant neoplasm of right ovary (HCC)    Stage 1 - oopherectomy 9/17. Nodes clear. No chemo/radiation.  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 Will do Brain CT in future.  Next follow with [facility] 4/18. 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 Omentum with chronic inflammation, negative for tumor. Pericaval lymph node x 1 negative for tumor. Peritoneal fluid negative for tumor.    Wears glasses    driving, studying              Past Surgical History:   Procedure Laterality Date    Fine needle aspir,superficial Bilateral 04/05/2021    Bilateral thyroid nodules - benign follicular nodules    Oophorectomy Right 2017    Sertoli Leydig cell tumor right ovary    Other surgical history  2004    Removal bronchial cyst Per NG surgery at [facility] by Dr Lee                Social History     Socioeconomic History    Marital status: Single   Tobacco Use    Smoking status: Some Days     Types: Cigarettes    Smokeless tobacco: Never    Tobacco comments:     1 cig every few months. Rare.    Vaping Use    Vaping status: Never Used   Substance and Sexual Activity    Alcohol use: Not Currently     Comment: occasional, social situations.    Drug use: Never    Sexual activity: Yes     Partners: Male     Birth control/protection: Condom   Other Topics Concern    Caffeine Concern No    Exercise No    Seat Belt No    Special Diet No    Stress Concern Yes     Comment: College student, and experienced 3 family deaths past 6 freddy    Weight Concern No              Review of Systems    Positive for stated complaint: Abdominal pain, vomiting  Other systems are as noted in HPI.  Constitutional and vital signs reviewed.       All other systems reviewed and negative except as noted above.                  Physical Exam    ED Triage Vitals [05/27/25 1701]   BP 96/55   Pulse 66   Resp 16   Temp 98.4 °F (36.9 °C)   Temp src Oral   SpO2 100 %   O2 Device None (Room air)       Current Vitals:   Vital Signs  BP: 96/55  Pulse: 66  Resp: 16  Temp: 98.4 °F (36.9 °C)  Temp src: Oral    Oxygen Therapy  SpO2: 100 %  O2 Device: None (Room air)            Physical Exam  Vitals and nursing note reviewed.   Constitutional:       General: She is not in acute distress.     Appearance: She is well-developed.   HENT:      Head: Normocephalic and atraumatic.   Eyes:      Conjunctiva/sclera: Conjunctivae normal.   Cardiovascular:      Rate and Rhythm: Normal rate and regular rhythm.      Heart sounds: Normal heart sounds.   Pulmonary:      Effort: Pulmonary effort is normal. No respiratory distress.      Breath sounds: Normal breath sounds.   Abdominal:      General: Bowel sounds are normal. There is no distension.      Palpations: Abdomen is soft.      Tenderness: There is abdominal tenderness in the right upper quadrant and epigastric area.   Musculoskeletal:         General: Normal range of motion.      Cervical back: Normal range of motion and neck supple.   Skin:     General: Skin is warm and dry.      Findings: No rash.   Neurological:      General: No focal deficit present.      Mental Status: She is alert and oriented to person, place, and time.                 ED Course  Labs Reviewed   [facility] POCT URINALYSIS DIPSTICK - Abnormal; Notable for the following components:       Result Value    Urine Clarity Slightly cloudy (*)     All other components within normal limits   POCT PREGNANCY URINE - Normal                            MDM            Medical Decision Making  Differential diagnosis includes but is not limited to peptic ulcer disease, cholelithiasis, cholecystitis, gastritis    Patient with nausea, vomiting, tenderness to epigastric abdomen as  well as right upper quadrant.  Advised ER evaluation.    Problems Addressed:  Epigastric pain: acute illness or injury    Amount and/or Complexity of Data Reviewed  Independent Historian: parent        Disposition and Plan     Clinical Impression:  1. Epigastric pain         Disposition:  Ic to ed  5/27/2025  5:09 pm    Follow-up:  No follow-up provider specified.        Medications Prescribed:  Discharge Medication List as of 5/27/2025  5:12 PM                Supplementary Documentation:

## 2025-05-28 NOTE — ED PROVIDER NOTES
Patient Seen in: [facility] Emergency Department    History     Chief Complaint   Patient presents with    Abdomen/Flank Pain       HPI    Patient presents to the ED complaining of epigastric abdominal pain.  She had some nausea vomiting yesterday but no vomiting today.  Long history of similar pain over the past 3 months.  Pain comes and goes, somewhat worse after eating.  Denies other complaints.    History reviewed. Past Medical History[1]    History reviewed. Past Surgical History[2]      Medications :  Prescriptions Prior to Admission[3]     Family History[4]    Smoking Status: Social Hx on file[5]    Constitutional and vital signs reviewed.      Social History and Family History elements reviewed from today, pertinent positives to the presenting problem noted.    Physical Exam     ED Triage Vitals [05/27/25 1742]   /66   Pulse 58   Resp 18   Temp 98.5 °F (36.9 °C)   Temp src Temporal   SpO2 98 %   O2 Device None (Room air)       All measures to prevent infection transmission during my interaction with the patient were taken. Handwashing was performed prior to and after the exam.  Stethoscope and any equipment used during my examination was cleaned with super sani-cloth germicidal wipes following the exam.     Physical Exam  Vitals and nursing note reviewed.   Constitutional:       General: She is not in acute distress.     Appearance: She is well-developed. She is not ill-appearing or toxic-appearing.   HENT:      Head: Normocephalic and atraumatic.   Eyes:      General:         Right eye: No discharge.         Left eye: No discharge.      Conjunctiva/sclera: Conjunctivae normal.   Neck:      Trachea: No tracheal deviation.   Cardiovascular:      Rate and Rhythm: Normal rate.   Pulmonary:      Effort: Pulmonary effort is normal. No respiratory distress.      Breath sounds: No stridor.   Abdominal:      General: There is no distension.      Palpations: Abdomen is soft.      Tenderness: There is  abdominal tenderness in the epigastric area. There is no guarding or rebound.   Musculoskeletal:         General: No deformity.   Skin:     General: Skin is warm and dry.   Neurological:      Mental Status: She is alert and oriented to person, place, and time.   Psychiatric:         Mood and Affect: Mood normal.         Behavior: Behavior normal.         ED Course        Labs Reviewed   BASIC METABOLIC PANEL (8) - Abnormal; Notable for the following components:       Result Value    Glucose 110 (*)     All other components within normal limits   HEPATIC FUNCTION PANEL (7) - Abnormal; Notable for the following components:    AST 97 (*)      (*)     All other components within normal limits   LIPASE - Abnormal; Notable for the following components:    Lipase 214 (*)     All other components within normal limits   CBC WITH DIFFERENTIAL WITH PLATELET       As Interpreted by me    Imaging Results Available and Reviewed while in ED: US GALLBLADDER (CPT=76705)  Result Date: 5/27/2025  CONCLUSION:  1. Cholelithiasis. 2. Mildly dilated common bile duct may be related to a recently passed stone or a undetected stone.    Dictated by (CST): Mainor Schmitt MD on 5/27/2025 at 9:42 PM     Finalized by (CST): Mianor Schmitt MD on 5/27/2025 at 9:45 PM          ED Medications Administered:   Medications   famotidine (Pepcid) 20 mg/2mL injection 20 mg (20 mg Intravenous Given 5/27/25 1834)   alum-mag hydroxide-simethicone (Maalox) 200-200-20 MG/5ML oral suspension 30 mL (30 mL Oral Given 5/27/25 1834)         MDM     Vitals:    05/27/25 1742 05/27/25 2214   BP: 102/66 105/88   Pulse: 58 62   Resp: 18 18   Temp: 98.5 °F (36.9 °C)    TempSrc: Temporal    SpO2: 98% 98%   Weight: 54.4 kg      *I personally reviewed and interpreted all ED vitals.    Pulse Ox: 98%, Room air, Normal       Differential Diagnosis/ Diagnostic Considerations: GERD, gastritis, PUD, pancreatitis, biliary colic, other    Complicating Factors: The patient  already has does not have any pertinent problems on file. to contribute to the complexity of this ED evaluation.    Medical Decision Making  Patient presents to the ED with intermittent epigastric pain for several months.  Nondistressed on exam.  Mild epigastric tenderness.  Patient was given a GI cocktail and laboratory testing sent.  Trace LFT elevation and trace lipase elevation.  Patient's pain resolved in the ED with GI medications.  No further pain or tenderness.  Gallbladder ultrasound obtained which shows cholelithiasis and likely recently passed stone which fits the patient's symptoms.  She had no further pain in the ED after ultrasound and feels well.  She would like to go home and I feel stable to do so but recommended outpatient surgical follow-up for cholecystectomy and return precautions.  Patient will avoid fatty foods    Problems Addressed:  Biliary colic: acute illness or injury  Calculus of gallbladder without cholecystitis without obstruction: acute illness or injury  Epigastric pain: acute illness or injury that poses a threat to life or bodily functions    Amount and/or Complexity of Data Reviewed  Labs: ordered. Decision-making details documented in ED Course.  Radiology: ordered and independent interpretation performed. Decision-making details documented in ED Course.     Details: I personally reviewed the patient's gallbladder ultrasound images and noted gallstones.    Risk  Prescription drug management.        Condition upon leaving the department: Stable    Disposition and Plan     Clinical Impression:  1. Epigastric pain    2. Calculus of gallbladder without cholecystitis without obstruction    3. Biliary colic        Disposition:  Discharge    Follow-up:  Kareem Sheffield MD  [address] [facility] [address] [facility] [address]  [place] [address]  [phone]    Schedule an appointment as soon as possible for a visit in 1 week(s)      Anthony Trivedi MD  [address] [facility] [address]  [place] [facility]  [address]  [phone]    Schedule an appointment as soon as possible for a visit in 3 day(s)        Medications Prescribed:  Discharge Medication List as of 5/27/2025 10:09 PM        START taking these medications    Details   pantoprazole 40 MG Oral Tab EC Take 1 tablet (40 mg total) by mouth daily for 30 doses., Normal, Disp-30 tablet, R-0      alum-mag hydroxide-simethicone 200-200-20 MG/5ML Oral Suspension Take 10 mL by mouth 4 (four) times daily as needed., Normal, Disp-710 mL, R-0                              [1]   Past Medical History:   Allergic rhinitis    Anxiety    Asthma (HCC)    childhood    Bronchial cyst    Dysmenorrhea    Elevated LFTs    Gallstones    History of primary malignant neoplasm of right ovary    Long QT syndrome    Phenotype positive (multiple ECGs with QTc > 470 ms; see August 2018 for an example)  Genotype positive (SCN5A [id number] [id number], heterozygous, likely pathologic variant)  Started on beta blockers in August 2018    Lung cyst    Diagnosed at 3 year of age. Surgically resected at [facility]. Benign per report    Malignant neoplasm of right ovary (HCC)    Stage 1 - oopherectomy 9/17. Nodes clear. No chemo/radiation.  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 Will do Brain CT in future.  Next follow with [place] Hematologist 4/18. 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 Omentum with chronic inflammation, negative for tumor. Pericaval lymph node x 1 negative for tumor. Peritoneal fluid negative for tumor.    Wears glasses     driving, studying   [2]   Past Surgical History:  Procedure Laterality Date    Fine needle aspir,superficial Bilateral 04/05/2021    Bilateral thyroid nodules - benign follicular nodules    Oophorectomy Right 2017    Sertoli Leydig cell tumor right ovary    Other surgical history  2004    Removal bronchial cyst Per NG surgery at [facility] by Dr Lee   [3] (Not in a hospital admission)  [4]   Family History  Problem Relation Age of Onset    Genetic Disease Mother         Carrier DICER1 mutation    Other (Prolonged QT syndrome) Mother     Thyroid disease Mother         Grave's disease    No Known Problems Father         Not much known about this side of family    Genetic Disease Sister         Carrier DICER1 mutation    Other (Prolonged QT syndrome) Sister     No Known Problems Sister     No Known Problems Brother     Diabetes Maternal Grandfather     Hypertension Maternal Grandfather     Prostate Cancer Maternal Grandfather     Stroke Maternal Grandfather     Ovarian Cancer Maternal Aunt 18    Thyroid Cancer Maternal Aunt         30s    Breast Cancer Other     Breast Cancer Other     Heart Disorder Neg     Lipids Neg     Obesity Neg     Psychiatric Neg     Colon Cancer Neg     Birth Defects Neg     Infertility Neg     Endometriosis Neg     Blood Clotting Disorder Neg    [5]   Social History  Socioeconomic History    Marital status: Single   Tobacco Use    Smoking status: Some Days     Types: Cigarettes    Smokeless tobacco: Never    Tobacco comments:     1 cig every few months. Rare.    Vaping Use    Vaping status: Never Used   Substance and Sexual Activity    Alcohol use: Not Currently     Comment: occasional, social situations.    Drug use: Never    Sexual activity: Yes     Partners: Male     Birth control/protection: Condom   Other Topics Concern    Caffeine Concern No    Exercise No    Seat Belt No    Special Diet No    Stress Concern Yes     Comment: College student, and experienced 3 family deaths past 6  freddy    Weight Concern No

## 2025-06-02 ENCOUNTER — OFFICE VISIT (OUTPATIENT)
Dept: SURGERY | Facility: CLINIC | Age: 25
End: 2025-06-02

## 2025-06-02 ENCOUNTER — LAB ENCOUNTER (OUTPATIENT)
Dept: LAB | Facility: HOSPITAL | Age: 25
End: 2025-06-02
Attending: SURGERY
Payer: COMMERCIAL

## 2025-06-02 VITALS — SYSTOLIC BLOOD PRESSURE: 94 MMHG | HEART RATE: 64 BPM | DIASTOLIC BLOOD PRESSURE: 63 MMHG

## 2025-06-02 DIAGNOSIS — K80.50 BILIARY COLIC: ICD-10-CM

## 2025-06-02 DIAGNOSIS — K80.50 BILIARY COLIC: Primary | ICD-10-CM

## 2025-06-02 LAB
ALBUMIN SERPL-MCNC: 4.5 G/DL (ref 3.2–4.8)
ALBUMIN/GLOB SERPL: 2 {RATIO} (ref 1–2)
ALP LIVER SERPL-CCNC: 96 U/L (ref 52–144)
ALT SERPL-CCNC: 99 U/L (ref 10–49)
ANION GAP SERPL CALC-SCNC: 8 MMOL/L (ref 0–18)
AST SERPL-CCNC: 17 U/L (ref ?–34)
BILIRUB SERPL-MCNC: 0.4 MG/DL (ref 0.3–1.2)
BUN BLD-MCNC: 9 MG/DL (ref 9–23)
BUN/CREAT SERPL: 13.2 (ref 10–20)
CALCIUM BLD-MCNC: 9.7 MG/DL (ref 8.7–10.4)
CHLORIDE SERPL-SCNC: 109 MMOL/L (ref 98–112)
CO2 SERPL-SCNC: 25 MMOL/L (ref 21–32)
CREAT BLD-MCNC: 0.68 MG/DL (ref 0.55–1.02)
EGFRCR SERPLBLD CKD-EPI 2021: 126 ML/MIN/1.73M2 (ref 60–?)
FASTING STATUS PATIENT QL REPORTED: YES
GLOBULIN PLAS-MCNC: 2.3 G/DL (ref 2–3.5)
GLUCOSE BLD-MCNC: 79 MG/DL (ref 70–99)
OSMOLALITY SERPL CALC.SUM OF ELEC: 292 MOSM/KG (ref 275–295)
POTASSIUM SERPL-SCNC: 3.9 MMOL/L (ref 3.5–5.1)
PROT SERPL-MCNC: 6.8 G/DL (ref 5.7–8.2)
SODIUM SERPL-SCNC: 142 MMOL/L (ref 136–145)

## 2025-06-02 PROCEDURE — 99204 OFFICE O/P NEW MOD 45 MIN: CPT | Performed by: SURGERY

## 2025-06-02 PROCEDURE — 80053 COMPREHEN METABOLIC PANEL: CPT

## 2025-06-02 PROCEDURE — 36415 COLL VENOUS BLD VENIPUNCTURE: CPT

## 2025-06-02 NOTE — H&P (VIEW-ONLY)
History and Physical      HPI       HPI  Yina Horan is a 24 year old female who presents with biliary colic and mild transaminitis with elevated lipase.  Seen recently in the emergency room.    Past Medical History[1]  Past Surgical History[2]  Current Medications[3]  ALLERGIES  Allergies[4]    Set as collapsible by default.[5]  Family History[6]    Review of Systems   A comprehensive 10 point review of systems was completed.  Pertinent positives and negatives noted in the the HPI.    PHYSICAL EXAM   LMP  (LMP Unknown)  No LMP recorded (lmp unknown).   Constitutional: appears well hydrated alert and responsive no acute distress noted  Head/Face: normocephalic  Nose/Mouth/Throat: nose and throat are clear palate is intact mucous membranes are moist no oral lesions are noted  Neck/Thyroid: neck is supple without adenopathy  Respiratory: normal to inspection lungs are clear to auscultation bilaterally normal respiratory effort  Cardiovascular: regular rate and rhythm no murmurs, gallups, or rubs  Abdomen: soft non-tender non-distended no organomegaly noted no masses  Extremities: no edema, cyanosis, or clubbing  Neurological: exam appropriate for age reflexes and motor skills appropriate for age      ASSESSMENT/PLAN   Assessment   Encounter Diagnosis   Name Primary?    Biliary colic Yes       24 year old female with biliary colic, transaminitis  We have discussed the surgical risks, benefits, alternatives, and expected recovery. We will plan repeat LFTs, plan laparoscopic cholecystectomy possible IOC. All of the patient's questions have been answered to her satisfaction.       6/2/2025  Anthony Trivedi MD               [1]   Past Medical History:   Allergic rhinitis    Anxiety    Asthma (HCC)    childhood    Bronchial cyst    Dysmenorrhea    Elevated LFTs    Gallstones    History of primary malignant neoplasm of right ovary    Long QT syndrome    Phenotype positive (multiple ECGs with QTc > 470 ms; see August 2018  for an example)  Genotype positive (SCN5A c.[id number] p.[id number], heterozygous, likely pathologic variant)  Started on beta blockers in August 2018    Lung cyst    Diagnosed at 3 year of age. Surgically resected at [facility]. Benign per report    Malignant neoplasm of right ovary (HCC)    Stage 1 - oopherectomy 9/17. Nodes clear. No chemo/radiation.  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 Will do Brain CT in future.  Next follow with [place] Hematologist 4/18. 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 Omentum with chronic inflammation, negative for tumor. Pericaval lymph node x 1 negative for tumor. Peritoneal fluid negative for tumor.    Wears glasses    driving, studying   [2]   Past Surgical History:  Procedure Laterality Date    Fine needle aspir,superficial Bilateral 04/05/2021    Bilateral thyroid nodules - benign follicular nodules    Oophorectomy Right 2017    Sertoli Leydig cell tumor right ovary    Other surgical history  2004    Removal bronchial cyst Per  surgery at [facility] by Dr Lee   [3]   Current Outpatient Medications   Medication Sig Dispense Refill    pantoprazole 40 MG Oral Tab EC Take 1 tablet (40 mg total) by mouth daily for 30 doses. 30 tablet 0    alum-mag hydroxide-simethicone 200-200-20 MG/5ML Oral Suspension Take 10 mL by mouth 4 (four) times daily as needed. 710 mL 0    Drospirenone (SLYND) 4 MG Oral Tab Take 1 tablet by mouth daily for 28 days. 84 tablet 3    nadolol 20 MG Oral Tab   2   [4]   Allergies  Allergen Reactions     Seasonal    [5]   Social History  Socioeconomic History    Marital status: Single   Tobacco Use    Smoking status: Some Days     Types: Cigarettes    Smokeless tobacco: Never    Tobacco comments:     1 cig every few months. Rare.    Vaping Use    Vaping status: Never Used   Substance and Sexual Activity    Alcohol use: Not Currently     Comment: occasional, social situations.    Drug use: Never    Sexual activity: Yes     Partners: Male     Birth control/protection: Condom   [6]   Family History  Problem Relation Age of Onset    Genetic Disease Mother         Carrier DICER1 mutation    Other (Prolonged QT syndrome) Mother     Thyroid disease Mother         Grave's disease    No Known Problems Father         Not much known about this side of family    Genetic Disease Sister         Carrier DICER1 mutation    Other (Prolonged QT syndrome) Sister     No Known Problems Sister     No Known Problems Brother     Diabetes Maternal Grandfather     Hypertension Maternal Grandfather     Prostate Cancer Maternal Grandfather     Stroke Maternal Grandfather     Ovarian Cancer Maternal Aunt 18    Thyroid Cancer Maternal Aunt         30s    Breast Cancer Other     Breast Cancer Other     Heart Disorder Neg     Lipids Neg     Obesity Neg     Psychiatric Neg     Colon Cancer Neg     Birth Defects Neg     Infertility Neg     Endometriosis Neg     Blood Clotting Disorder Neg

## 2025-06-02 NOTE — H&P
History and Physical      HPI       HPI  Yina Horan is a 24 year old female who presents with biliary colic and mild transaminitis with elevated lipase.  Seen recently in the emergency room.    Past Medical History[1]  Past Surgical History[2]  Current Medications[3]  ALLERGIES  Allergies[4]    Set as collapsible by default.[5]  Family History[6]    Review of Systems   A comprehensive 10 point review of systems was completed.  Pertinent positives and negatives noted in the the HPI.    PHYSICAL EXAM   LMP  (LMP Unknown)  No LMP recorded (lmp unknown).   Constitutional: appears well hydrated alert and responsive no acute distress noted  Head/Face: normocephalic  Nose/Mouth/Throat: nose and throat are clear palate is intact mucous membranes are moist no oral lesions are noted  Neck/Thyroid: neck is supple without adenopathy  Respiratory: normal to inspection lungs are clear to auscultation bilaterally normal respiratory effort  Cardiovascular: regular rate and rhythm no murmurs, gallups, or rubs  Abdomen: soft non-tender non-distended no organomegaly noted no masses  Extremities: no edema, cyanosis, or clubbing  Neurological: exam appropriate for age reflexes and motor skills appropriate for age      ASSESSMENT/PLAN   Assessment   Encounter Diagnosis   Name Primary?    Biliary colic Yes       24 year old female with biliary colic, transaminitis  We have discussed the surgical risks, benefits, alternatives, and expected recovery. We will plan repeat LFTs, plan laparoscopic cholecystectomy possible IOC. All of the patient's questions have been answered to her satisfaction.       6/2/2025  Anthony Trivedi MD               [1]   Past Medical History:   Allergic rhinitis    Anxiety    Asthma (HCC)    childhood    Bronchial cyst    Dysmenorrhea    Elevated LFTs    Gallstones    History of primary malignant neoplasm of right ovary    Long QT syndrome    Phenotype positive (multiple ECGs with QTc > 470 ms; see August 2018  for an example)  Genotype positive (SCN5A c.[id number] p.[id number], heterozygous, likely pathologic variant)  Started on beta blockers in August 2018    Lung cyst    Diagnosed at 3 year of age. Surgically resected at [facility]. Benign per report    Malignant neoplasm of right ovary (HCC)    Stage 1 - oopherectomy 9/17. Nodes clear. No chemo/radiation.  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 Will do Brain CT in future.  Next follow with [place]ologist 4/18. 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 Omentum with chronic inflammation, negative for tumor. Pericaval lymph node x 1 negative for tumor. Peritoneal fluid negative for tumor.    Wears glasses    driving, studying   [2]   Past Surgical History:  Procedure Laterality Date    Fine needle aspir,superficial Bilateral 04/05/2021    Bilateral thyroid nodules - benign follicular nodules    Oophorectomy Right 2017    Sertoli Leydig cell tumor right ovary    Other surgical history  2004    Removal bronchial cyst Per  surgery at [facility] by Dr Lee   [3]   Current Outpatient Medications   Medication Sig Dispense Refill    pantoprazole 40 MG Oral Tab EC Take 1 tablet (40 mg total) by mouth daily for 30 doses. 30 tablet 0    alum-mag hydroxide-simethicone 200-200-20 MG/5ML Oral Suspension Take 10 mL by mouth 4 (four) times daily as needed. 710 mL 0    Drospirenone (SLYND) 4 MG Oral Tab Take 1 tablet by mouth daily for 28 days. 84 tablet 3    nadolol 20 MG Oral Tab   2   [4]   Allergies  Allergen Reactions     Seasonal    [5]   Social History  Socioeconomic History    Marital status: Single   Tobacco Use    Smoking status: Some Days     Types: Cigarettes    Smokeless tobacco: Never    Tobacco comments:     1 cig every few months. Rare.    Vaping Use    Vaping status: Never Used   Substance and Sexual Activity    Alcohol use: Not Currently     Comment: occasional, social situations.    Drug use: Never    Sexual activity: Yes     Partners: Male     Birth control/protection: Condom   [6]   Family History  Problem Relation Age of Onset    Genetic Disease Mother         Carrier DICER1 mutation    Other (Prolonged QT syndrome) Mother     Thyroid disease Mother         Grave's disease    No Known Problems Father         Not much known about this side of family    Genetic Disease Sister         Carrier DICER1 mutation    Other (Prolonged QT syndrome) Sister     No Known Problems Sister     No Known Problems Brother     Diabetes Maternal Grandfather     Hypertension Maternal Grandfather     Prostate Cancer Maternal Grandfather     Stroke Maternal Grandfather     Ovarian Cancer Maternal Aunt 18    Thyroid Cancer Maternal Aunt         30s    Breast Cancer Other     Breast Cancer Other     Heart Disorder Neg     Lipids Neg     Obesity Neg     Psychiatric Neg     Colon Cancer Neg     Birth Defects Neg     Infertility Neg     Endometriosis Neg     Blood Clotting Disorder Neg

## 2025-06-03 ENCOUNTER — TELEPHONE (OUTPATIENT)
Dept: SURGERY | Facility: CLINIC | Age: 25
End: 2025-06-03

## 2025-06-03 DIAGNOSIS — K80.50 BILIARY COLIC: Primary | ICD-10-CM

## 2025-06-11 DIAGNOSIS — Z85.43: ICD-10-CM

## 2025-06-11 DIAGNOSIS — R79.89 ELEVATED LFTS: ICD-10-CM

## 2025-06-11 DIAGNOSIS — Z15.09 MONOALLELIC MUTATION OF DICER1 GENE: ICD-10-CM

## 2025-06-11 DIAGNOSIS — Z15.89 MONOALLELIC MUTATION OF DICER1 GENE: ICD-10-CM

## 2025-06-12 RX ORDER — DROSPIRENONE 4 MG/1
1 TABLET, FILM COATED ORAL DAILY
Qty: 84 TABLET | Refills: 2 | Status: SHIPPED | OUTPATIENT
Start: 2025-06-12 | End: 2025-07-10

## 2025-06-16 NOTE — DISCHARGE INSTRUCTIONS
Ice pack as needed.    May shower in 24 hours.  Remove outer Band-Aids in 24 hours leave white strips for 1 week  Ibuprofen 600 mg every 6 hours for pain, Norco for breakthrough pain  Take Colace twice a day while taking Norco as a stool softener  15 pound lifting restriction 4 weeks.    May drive in 1 week or sooner if pain-free  Follow-up with PA in 2 weeks for wound check  Advance diet as tolerated     HOME INSTRUCTIONS  [facility] HOME CARE INSTRUCTIONS: POST-OP ANESTHESIA  The medication that you received for sedation or general anesthesia can last up to 24 hours. Your judgment and reflexes may be altered, even if you feel like your normal self.      We Recommend:   Do not drive any motor vehicle or bicycle   Avoid mowing the lawn, playing sports, or working with power tools/applicances (power saws, electric knives or mixers)   That you have someone stay with you on your first night home   Do not drink alcohol or take sleeping pills or tranquilizers   Do not sign legal documents within 24 hours of your procedure   If you had a nerve block for your surgery, take extra care not to put any pressure on your arm or hand for 24 hours    It is normal:  For you to have a sore throat if you had a breathing tube during surgery (while you were asleep!). The sore throat should get better within 48 hours. You can gargle with warm salt water (1/2 tsp in 4 oz warm water) or use a throat lozenge for comfort  To feel muscle aches or soreness especially in the abdomen, chest or neck. The achy feeling should go away in the next 24 hours  To feel weak, sleepy or \"wiped out\". Your should start feeling better in the next 24 hours.   To experience mild discomforts such as sore lip or tongue, headache, cramps, gas pains or a bloated feeling in your abdomen.   To experience mild back pain or soreness for a day or two if you had spinal or epidural anesthesia.   If you had laparoscopic surgery, to feel shoulder pain or discomfort on the day  of surgery.   For some patients to have nausea after surgery/anesthesia    If you feel nausea or experience vomiting:   Try to move around less.   Eat less than usual or drink only liquids until the next morning   Nausea should resolve in about 24 hours    If you have a problem when you are at home:    Call your surgeons office   Discharge Instructions: After Your Surgery  You’ve just had surgery. During surgery, you were given medicine called anesthesia to keep you relaxed and free of pain. After surgery, you may have some pain or nausea. This is common. Here are some tips for feeling better and getting well after surgery.   Going home  Your healthcare provider will show you how to take care of yourself when you go home. They'll also answer your questions. Have an adult family member or friend drive you home. For the first 24 hours after your surgery:   Don't drive or use heavy equipment.  Don't make important decisions or sign legal papers.  Take medicines as directed.  Don't drink alcohol.  Have someone stay with you, if needed. They can watch for problems and help keep you safe.  Be sure to go to all follow-up visits with your healthcare provider. And rest after your surgery for as long as your provider tells you to.   Coping with pain  If you have pain after surgery, pain medicine will help you feel better. Take it as directed, before pain becomes severe. Also, ask your healthcare provider or pharmacist about other ways to control pain. This might be with heat, ice, or relaxation. And follow any other instructions your surgeon or nurse gives you.      Stay on schedule with your medicine.     Tips for taking pain medicine  To get the best relief possible, remember these points:   Pain medicines can upset your stomach. Taking them with a little food may help.  Most pain relievers taken by mouth need at least 20 to 30 minutes to start to work.  Don't wait till your pain becomes severe before you take your  medicine. Try to time your medicine so that you can take it before starting an activity. This might be before you get dressed, go for a walk, or sit down for dinner.  Constipation is a common side effect of some pain medicines. Call your healthcare provider before taking any medicines, such as laxatives or stool softeners, to help ease constipation. Also ask if you should skip any foods. Drinking lots of fluids and eating foods, such as fruits and vegetables, that are high in fiber can also help. Remember, don't take laxatives unless your surgeon has prescribed them.  Drinking alcohol and taking pain medicine can cause dizziness and slow your breathing. It can even be deadly. Don't drink alcohol while taking pain medicine.  Pain medicine can make you react more slowly to things. Don't drive or run machinery while taking pain medicine.  Your healthcare provider may tell you to take acetaminophen to help ease your pain. Ask them how much you're supposed to take each day. Acetaminophen or other pain relievers may interact with your prescription medicines or other over-the-counter (OTC) medicines. Some prescription medicines have acetaminophen and other ingredients in them. Using both prescription and OTC acetaminophen for pain can cause you to accidentally overdose. Read the labels on your OTC medicines with care. This will help you to clearly know the list of ingredients, how much to take, and any warnings. It may also help you not take too much acetaminophen. If you have questions or don't understand the information, ask your pharmacist or healthcare provider to explain it to you before you take the OTC medicine.   Managing nausea  Some people have an upset stomach (nausea) after surgery. This is often because of anesthesia, pain, or pain medicine, less movement of food in the stomach, or the stress of surgery. These tips will help you handle nausea and eat healthy foods as you get better. If you were on a special  food plan before surgery, ask your healthcare provider if you should follow it while you get better. Check with your provider on how your eating should progress. It may depend on the surgery you had. These general tips may help:   Don't push yourself to eat. Your body will tell you when to eat and how much.  Start off with clear liquids and soup. They're easier to digest.  Next try semi-solid foods as you feel ready. These include mashed potatoes, applesauce, and gelatin.  Slowly move to solid foods. Don’t eat fatty, rich, or spicy foods at first.  Don't force yourself to have 3 large meals a day. Instead eat smaller amounts more often.  Take pain medicines with a small amount of solid food, such as crackers or toast. This helps prevent nausea.  When to call your healthcare provider  Call your healthcare provider right away if you have any of these:   You still have too much pain, or the pain gets worse, after taking the medicine. The medicine may not be strong enough. Or there may be a complication from the surgery.  You feel too sleepy, dizzy, or groggy. The medicine may be too strong.  Side effects, such as nausea or vomiting. Your healthcare provider may advise taking other medicines to treat these or may change your treatment plan..  Skin changes, such as rash, itching, or hives. This may mean you have an allergic reaction. Your provider may advise taking other medicines.  The incision looks different (for instance, part of it opens up).  Bleeding or fluid leaking from the incision site, and you weren't told to expect that.  Fever of 100.4°F (38°C) or higher, or as directed by your healthcare provider.  Call 911  Call 911 right away if you have:   Trouble breathing  Facial swelling    If you have obstructive sleep apnea   You were given anesthesia medicine during surgery to keep you comfortable and free of pain. After surgery, you may have more apnea spells because of this medicine and other medicines you were  given. The spells may last longer than normal.    At home:  Keep using the continuous positive airway pressure (CPAP) device when you sleep. Unless your healthcare provider tells you not to, use it when you sleep, day or night. CPAP is a common device used to treat obstructive sleep apnea.  Talk with your provider before taking any pain medicine, muscle relaxants, or sedatives. Your provider will tell you about the possible dangers of taking these medicines.  Contact your provider if your sleeping changes a lot even when taking medicines as directed.  [employer] last reviewed this educational content on 4/1/2024  This information is for informational purposes only. This is not intended to be a substitute for professional medical advice, diagnosis, or treatment. Always seek the advice and follow the directions from your physician or other qualified health care provider.  © 3500-8267 [employer]. All rights reserved. This information is not intended as a substitute for professional medical care. Always follow your healthcare professional's instructions.

## 2025-06-17 ENCOUNTER — ANESTHESIA EVENT (OUTPATIENT)
Dept: SURGERY | Facility: HOSPITAL | Age: 25
End: 2025-06-17
Payer: COMMERCIAL

## 2025-06-17 ENCOUNTER — HOSPITAL ENCOUNTER (OUTPATIENT)
Facility: HOSPITAL | Age: 25
Setting detail: HOSPITAL OUTPATIENT SURGERY
Discharge: HOME | End: 2025-06-17
Attending: SURGERY | Admitting: SURGERY
Payer: COMMERCIAL

## 2025-06-17 ENCOUNTER — ANESTHESIA (OUTPATIENT)
Dept: SURGERY | Facility: HOSPITAL | Age: 25
End: 2025-06-17
Payer: COMMERCIAL

## 2025-06-17 VITALS
WEIGHT: 120 LBS | BODY MASS INDEX: 22.08 KG/M2 | TEMPERATURE: 97 F | SYSTOLIC BLOOD PRESSURE: 106 MMHG | HEART RATE: 56 BPM | OXYGEN SATURATION: 99 % | DIASTOLIC BLOOD PRESSURE: 69 MMHG | RESPIRATION RATE: 16 BRPM | HEIGHT: 62 IN

## 2025-06-17 DIAGNOSIS — G89.18 POST-OPERATIVE PAIN: Primary | ICD-10-CM

## 2025-06-17 DIAGNOSIS — K80.50 BILIARY COLIC: ICD-10-CM

## 2025-06-17 PROBLEM — K81.9 CHOLECYSTITIS: Status: ACTIVE | Noted: 2025-06-17

## 2025-06-17 LAB — B-HCG UR QL: NEGATIVE

## 2025-06-17 PROCEDURE — 47562 LAPAROSCOPIC CHOLECYSTECTOMY: CPT | Performed by: SURGERY

## 2025-06-17 RX ORDER — ROCURONIUM BROMIDE 10 MG/ML
INJECTION, SOLUTION INTRAVENOUS AS NEEDED
Status: DISCONTINUED | OUTPATIENT
Start: 2025-06-17 | End: 2025-06-17 | Stop reason: SURG

## 2025-06-17 RX ORDER — DEXAMETHASONE SODIUM PHOSPHATE 4 MG/ML
VIAL (ML) INJECTION AS NEEDED
Status: DISCONTINUED | OUTPATIENT
Start: 2025-06-17 | End: 2025-06-17 | Stop reason: SURG

## 2025-06-17 RX ORDER — MORPHINE SULFATE 10 MG/ML
6 INJECTION, SOLUTION INTRAMUSCULAR; INTRAVENOUS EVERY 10 MIN PRN
Status: DISCONTINUED | OUTPATIENT
Start: 2025-06-17 | End: 2025-06-17

## 2025-06-17 RX ORDER — IBUPROFEN 600 MG/1
600 TABLET, FILM COATED ORAL EVERY 8 HOURS PRN
Qty: 15 TABLET | Refills: 0 | Status: SHIPPED | OUTPATIENT
Start: 2025-06-17 | End: 2025-06-30

## 2025-06-17 RX ORDER — HYDROMORPHONE HYDROCHLORIDE 1 MG/ML
0.4 INJECTION, SOLUTION INTRAMUSCULAR; INTRAVENOUS; SUBCUTANEOUS EVERY 5 MIN PRN
Status: DISCONTINUED | OUTPATIENT
Start: 2025-06-17 | End: 2025-06-17

## 2025-06-17 RX ORDER — METOPROLOL TARTRATE 1 MG/ML
2.5 INJECTION, SOLUTION INTRAVENOUS ONCE
Status: DISCONTINUED | OUTPATIENT
Start: 2025-06-17 | End: 2025-06-17

## 2025-06-17 RX ORDER — HYDROMORPHONE HYDROCHLORIDE 1 MG/ML
0.6 INJECTION, SOLUTION INTRAMUSCULAR; INTRAVENOUS; SUBCUTANEOUS EVERY 5 MIN PRN
Status: DISCONTINUED | OUTPATIENT
Start: 2025-06-17 | End: 2025-06-17

## 2025-06-17 RX ORDER — METOPROLOL TARTRATE 25 MG/1
25 TABLET, FILM COATED ORAL ONCE AS NEEDED
Status: DISCONTINUED | OUTPATIENT
Start: 2025-06-17 | End: 2025-06-17 | Stop reason: HOSPADM

## 2025-06-17 RX ORDER — HYDROMORPHONE HYDROCHLORIDE 1 MG/ML
0.2 INJECTION, SOLUTION INTRAMUSCULAR; INTRAVENOUS; SUBCUTANEOUS EVERY 5 MIN PRN
Status: DISCONTINUED | OUTPATIENT
Start: 2025-06-17 | End: 2025-06-17

## 2025-06-17 RX ORDER — ACETAMINOPHEN 500 MG
1000 TABLET ORAL ONCE
Status: COMPLETED | OUTPATIENT
Start: 2025-06-17 | End: 2025-06-17

## 2025-06-17 RX ORDER — BUPIVACAINE HCL/EPINEPHRINE 0.25-.0005
VIAL (ML) INJECTION AS NEEDED
Status: DISCONTINUED | OUTPATIENT
Start: 2025-06-17 | End: 2025-06-17 | Stop reason: HOSPADM

## 2025-06-17 RX ORDER — LIDOCAINE HYDROCHLORIDE 10 MG/ML
INJECTION, SOLUTION EPIDURAL; INFILTRATION; INTRACAUDAL; PERINEURAL AS NEEDED
Status: DISCONTINUED | OUTPATIENT
Start: 2025-06-17 | End: 2025-06-17 | Stop reason: SURG

## 2025-06-17 RX ORDER — MORPHINE SULFATE 4 MG/ML
4 INJECTION, SOLUTION INTRAMUSCULAR; INTRAVENOUS EVERY 10 MIN PRN
Status: DISCONTINUED | OUTPATIENT
Start: 2025-06-17 | End: 2025-06-17

## 2025-06-17 RX ORDER — MIDAZOLAM HYDROCHLORIDE 1 MG/ML
INJECTION INTRAMUSCULAR; INTRAVENOUS AS NEEDED
Status: DISCONTINUED | OUTPATIENT
Start: 2025-06-17 | End: 2025-06-17 | Stop reason: SURG

## 2025-06-17 RX ORDER — SODIUM CHLORIDE, SODIUM LACTATE, POTASSIUM CHLORIDE, CALCIUM CHLORIDE 600; 310; 30; 20 MG/100ML; MG/100ML; MG/100ML; MG/100ML
INJECTION, SOLUTION INTRAVENOUS CONTINUOUS
Status: DISCONTINUED | OUTPATIENT
Start: 2025-06-17 | End: 2025-06-17

## 2025-06-17 RX ORDER — KETOROLAC TROMETHAMINE 30 MG/ML
INJECTION, SOLUTION INTRAMUSCULAR; INTRAVENOUS AS NEEDED
Status: DISCONTINUED | OUTPATIENT
Start: 2025-06-17 | End: 2025-06-17 | Stop reason: SURG

## 2025-06-17 RX ORDER — HYDROCODONE BITARTRATE AND ACETAMINOPHEN 5; 325 MG/1; MG/1
1-2 TABLET ORAL EVERY 6 HOURS PRN
Qty: 10 TABLET | Refills: 0 | Status: SHIPPED | OUTPATIENT
Start: 2025-06-17 | End: 2025-06-30

## 2025-06-17 RX ORDER — MORPHINE SULFATE 4 MG/ML
2 INJECTION, SOLUTION INTRAMUSCULAR; INTRAVENOUS EVERY 10 MIN PRN
Status: DISCONTINUED | OUTPATIENT
Start: 2025-06-17 | End: 2025-06-17

## 2025-06-17 RX ORDER — DOCUSATE SODIUM 100 MG/1
100 CAPSULE, LIQUID FILLED ORAL 2 TIMES DAILY
Qty: 30 CAPSULE | Refills: 0 | Status: SHIPPED | OUTPATIENT
Start: 2025-06-17 | End: 2025-06-30

## 2025-06-17 RX ORDER — ONDANSETRON 2 MG/ML
INJECTION INTRAMUSCULAR; INTRAVENOUS AS NEEDED
Status: DISCONTINUED | OUTPATIENT
Start: 2025-06-17 | End: 2025-06-17 | Stop reason: SURG

## 2025-06-17 RX ORDER — NALOXONE HYDROCHLORIDE 0.4 MG/ML
80 INJECTION, SOLUTION INTRAMUSCULAR; INTRAVENOUS; SUBCUTANEOUS AS NEEDED
Status: DISCONTINUED | OUTPATIENT
Start: 2025-06-17 | End: 2025-06-17

## 2025-06-17 RX ADMIN — MIDAZOLAM HYDROCHLORIDE 2 MG: 1 INJECTION INTRAMUSCULAR; INTRAVENOUS at 08:26:00

## 2025-06-17 RX ADMIN — LIDOCAINE HYDROCHLORIDE 80 MG: 10 INJECTION, SOLUTION EPIDURAL; INFILTRATION; INTRACAUDAL; PERINEURAL at 08:31:00

## 2025-06-17 RX ADMIN — ROCURONIUM BROMIDE 40 MG: 10 INJECTION, SOLUTION INTRAVENOUS at 08:31:00

## 2025-06-17 RX ADMIN — ONDANSETRON 4 MG: 2 INJECTION INTRAMUSCULAR; INTRAVENOUS at 08:39:00

## 2025-06-17 RX ADMIN — DEXAMETHASONE SODIUM PHOSPHATE 4 MG: 4 MG/ML VIAL (ML) INJECTION at 08:39:00

## 2025-06-17 RX ADMIN — KETOROLAC TROMETHAMINE 30 MG: 30 INJECTION, SOLUTION INTRAMUSCULAR; INTRAVENOUS at 09:04:00

## 2025-06-17 NOTE — ANESTHESIA PREPROCEDURE EVALUATION
Anesthesia PreOp Note    HPI:     Yina Horan is a 24 year old female who presents for preoperative consultation requested by: Anthony Trivedi MD    Date of Surgery: 6/17/2025    Procedure(s):  Laparoscopic cholecystectomy  Indication: Biliary colic [K80.50]    Relevant Problems   No relevant active problems       NPO:  Last Liquid Consumption Date: 06/16/25  Last Liquid Consumption Time: 2330  Last Solid Consumption Date: 06/16/25  Last Solid Consumption Time: 2200  Last Liquid Consumption Date: 06/16/25          History Review:  Patient Active Problem List    Diagnosis Date Noted    Vaginal discharge 03/01/2024    Pelvic floor dysfunction in female 03/01/2024    Elevated LFTs 03/01/2024    Stress 03/01/2024    Encounter for well woman exam with abnormal findings 03/01/2024    Anxiety 08/05/2022    History of primary malignant neoplasm of right ovary 08/05/2022    Long QT syndrome 05/17/2018    Monoallelic mutation of DICER1 gene 05/17/2018    Malignant neoplasm of right ovary (HCC) 02/20/2018    Multiple thyroid nodules 10/27/2017    Sertoli-Leydig cell tumor of ovary, right 09/23/2017    Lung cyst 01/01/2003       Past Medical History[1]    Past Surgical History[2]    Prescriptions Prior to Admission[3]  Current Medications and Prescriptions Ordered in Epic[4]    Allergies[5]    Family History[6]  Social Hx on file[7]    Available pre-op labs reviewed.  Lab Results   Component Value Date    WBC 10.2 05/27/2025    RBC 4.22 05/27/2025    HGB 13.2 05/27/2025    HCT 38.9 05/27/2025    MCV 92.2 05/27/2025    MCH 31.3 05/27/2025    MCHC 33.9 05/27/2025    RDW 12.5 05/27/2025    .0 05/27/2025    URINEPREG Negative 06/17/2025     Lab Results   Component Value Date     06/02/2025    K 3.9 06/02/2025     06/02/2025    CO2 25.0 06/02/2025    BUN 9 06/02/2025    CREATSERUM 0.68 06/02/2025    GLU 79 06/02/2025    CA 9.7 06/02/2025          Vital Signs:  Body mass index is 21.95 kg/m².   height is 1.575 m  (5' 2\") and weight is 54.4 kg (120 lb). Her oral temperature is 98.9 °F (37.2 °C). Her blood pressure is 101/62 and her pulse is 62. Her respiration is 16 and oxygen saturation is 99%.   Vitals:    06/16/25 1131 06/17/25 0726   BP:  101/62   Pulse:  62   Resp:  16   Temp:  98.9 °F (37.2 °C)   TempSrc:  Oral   SpO2:  99%   Weight: 54.4 kg (120 lb) 54.4 kg (120 lb)   Height: 1.575 m (5' 2\") 1.575 m (5' 2\")        Anesthesia Evaluation     Patient summary reviewed and Nursing notes reviewed    No history of anesthetic complications   Airway   Mallampati: II  Neck ROM: full  Dental      Pulmonary - negative ROS and normal exam   Cardiovascular - negative ROS and normal exam  (+) dysrhythmias    Neuro/Psych - negative ROS     GI/Hepatic/Renal - negative ROS     Endo/Other - negative ROS   Abdominal  - normal exam                 Anesthesia Plan:   ASA:  2  Plan:   General  Airway:  ETT  Post-op Pain Management: IV analgesics  Informed Consent Plan and Risks Discussed With:  Patient  Discussed plan with:  CRNA      I have informed Yinabran Horan and/or legal guardian or family member of the nature of the anesthetic plan, benefits, risks including possible dental damage if relevant, major complications, and any alternative forms of anesthetic management.   All of the patient's questions were answered to the best of my ability. The patient desires the anesthetic management as planned.  Marty Funes MD  6/17/2025 8:12 AM  Present on Admission:  **None**           [1]   Past Medical History:   Allergic rhinitis    Anxiety    Asthma (HCC)    childhood    Bronchial cyst    Dysmenorrhea    Elevated LFTs    Gallstones    History of primary malignant neoplasm of right ovary    Long QT syndrome    Phenotype positive (multiple ECGs with QTc > 470 ms; see August 2018 for an example)  Genotype positive (SCN5A [id number] [id number], heterozygous, likely pathologic variant)  Started on beta blockers in August 2018    Lung  cyst    Diagnosed at 3 year of age. Surgically resected at [facility]. Benign per report    Malignant neoplasm of right ovary (HCC)    Stage 1 - oopherectomy 9/17. Nodes clear. No chemo/radiation.  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 Will do Brain CT in future.  Next follow with [facility] 4/18. 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 Omentum with chronic inflammation, negative for tumor. Pericaval lymph node x 1 negative for tumor. Peritoneal fluid negative for tumor.    Visual impairment    glasses    Wears glasses    driving, studying   [2]   Past Surgical History:  Procedure Laterality Date    Fine needle aspir,superficial Bilateral 04/05/2021    Bilateral thyroid nodules - benign follicular nodules    Oophorectomy Right 2017    Sertoli Leydig cell tumor right ovary    Other surgical history  2004    Removal bronchial cyst Per NG surgery at [facility] by Dr Lee   [3]   Medications Prior to Admission   Medication Sig Dispense Refill Last Dose/Taking    Drospirenone (SLYND) 4 MG Oral Tab Take 1 tablet by mouth daily for 28 days. 84 tablet 2 6/3/2025    nadolol 20 MG Oral Tab Take 3 tablets (60 mg total) by mouth every evening.  2 6/16/2025 at 10:00 PM   [4]   Current Facility-Administered Medications Ordered in Epic   Medication Dose Route Frequency Provider Last Rate Last Admin    lactated ringers infusion   Intravenous Continuous Anthony Trivedi MD 20 mL/hr at 06/17/25 0740 New Bag at 06/17/25 0740    metoprolol tartrate (Lopressor) tab 25 mg  25 mg  Oral Once PRN Anthony Trivedi MD        ceFAZolin (Ancef) 2g in 10mL IV syringe premix  2 g Intravenous Once Anthony Trivedi MD         Current Outpatient Medications Ordered in Epic   Medication Sig Dispense Refill    ibuprofen 600 MG Oral Tab Take 1 tablet (600 mg total) by mouth every 8 (eight) hours as needed for Pain. 15 tablet 0    HYDROcodone-acetaminophen 5-325 MG Oral Tab Take 1-2 tablets by mouth every 6 (six) hours as needed for Pain. 10 tablet 0    docusate sodium 100 MG Oral Cap Take 1 capsule (100 mg total) by mouth 2 (two) times daily. 30 capsule 0   [5]   Allergies  Allergen Reactions    Seasonal OTHER (SEE COMMENTS)     Runny nose, watery eye   [6]   Family History  Problem Relation Age of Onset    Genetic Disease Mother         Carrier DICER1 mutation    Other (Prolonged QT syndrome) Mother     Thyroid disease Mother         Grave's disease    No Known Problems Father         Not much known about this side of family    Genetic Disease Sister         Carrier DICER1 mutation    Other (Prolonged QT syndrome) Sister     No Known Problems Sister     No Known Problems Brother     Diabetes Maternal Grandfather     Hypertension Maternal Grandfather     Prostate Cancer Maternal Grandfather     Stroke Maternal Grandfather     Ovarian Cancer Maternal Aunt 18    Thyroid Cancer Maternal Aunt         30s    Breast Cancer Other     Breast Cancer Other     Heart Disorder Neg     Lipids Neg     Obesity Neg     Psychiatric Neg     Colon Cancer Neg     Birth Defects Neg     Infertility Neg     Endometriosis Neg     Blood Clotting Disorder Neg    [7]   Social History  Socioeconomic History    Marital status: Single   Tobacco Use    Smoking status: Some Days     Types: Cigarettes    Smokeless tobacco: Never    Tobacco comments:     1 cig every few months. Rare.    Vaping Use    Vaping status: Never Used   Substance and Sexual Activity    Alcohol use: Not Currently     Comment: occasional, social situations.    Drug use:  Never    Sexual activity: Yes     Partners: Male     Birth control/protection: Condom   Other Topics Concern    Caffeine Concern No    Exercise No    Seat Belt No    Special Diet No    Stress Concern Yes     Comment: College student, and experienced 3 family deaths past 6 freddy    Weight Concern No

## 2025-06-17 NOTE — ANESTHESIA POSTPROCEDURE EVALUATION
Patient: Yina Horan    Procedure Summary       Date: 06/17/25 Room / Location: [facility] MAIN OR 08 / [facility] MAIN OR    Anesthesia Start: 0826 Anesthesia Stop: 0924    Procedure: Laparoscopic cholecystectomy (Abdomen) Diagnosis:       Biliary colic      (Biliary colic [K80.50])    Surgeons: Anthony Trivedi MD Anesthesiologist: Marty Funes MD    Anesthesia Type: general ASA Status: 2            Anesthesia Type: general    Vitals Value Taken Time   BP 89/57 06/17/25 09:26   Temp 97.4F 06/17/25 09:26   Pulse 54 06/17/25 09:25   Resp 13 06/17/25 09:25   SpO2 100 % 06/17/25 09:25   Vitals shown include unfiled device data.    EM AN Post Evaluation:   Patient Evaluated in PACU  Patient Participation: complete - patient participated  Level of Consciousness: sleepy but conscious  Pain Score: 0  Pain Management: adequate  Airway Patency:patent  Dental exam unchanged from preop  Yes    Nausea/Vomiting: none  Cardiovascular Status: blood pressure returned to baseline, hemodynamically stable and stable  Respiratory Status: acceptable and face mask  Postoperative Hydration acceptable      Cecilia Mcclelland CRNA  6/17/2025 9:26 AM

## 2025-06-17 NOTE — OPERATIVE REPORT
[facility]    PATIENT'S NAME: ANDRESSA PHILIPPE   ATTENDING PHYSICIAN: Anthony Trivedi MD   OPERATING PHYSICIAN: Anthony Trivedi MD   PATIENT ACCOUNT#:   [id number]    LOCATION:  [facility] 13 [facility] 10  MEDICAL RECORD #:   [id number]       YOB: 2000  ADMISSION DATE:       06/17/2025      OPERATION DATE:  06/17/2025    OPERATIVE REPORT    PREOPERATIVE DIAGNOSIS:  Cholecystitis.  POSTOPERATIVE DIAGNOSIS:  Severe chronic cholecystitis, fatty liver.   PROCEDURE:  Laparoscopic cholecystectomy.    ASSISTANT:  NEYMAR Gill    ESTIMATED BLOOD LOSS:  5 mL.    COMPLICATIONS:  None.    ANESTHESIA:  General.    DISPOSITION:  To Recovery, tolerated well.    INDICATIONS:  The patient is a pleasant 24-year-old with the above complaint.  Consent obtained.      OPERATIVE TECHNIQUE:  Taken to surgery, prepped and draped in usual sterile fashion.  Veress needle placed at Mike's point and abdomen insufflated.  Supraumbilical open technique was used.  Adhesions were encountered, but I easily passed a 12.  Three additional trocars were placed under laparoscopic guidance.  Exploration revealed fatty liver, severe chronic cholecystitis with a shrunken gallbladder with multiple stones.  Gallbladder was retracted.  Dissection performed.  Cystic artery and cystic duct dissected to their junction with the gallbladder.  Critical view was obtained.  Clips were placed on either side of these structures and they were ligated.  Gallbladder taken from the liver using electrocautery.  Hemostasis maintained.  Gallbladder retrieved using an Endobag.  Right upper quadrant irrigated, hemostasis verified.  Trocars removed.  Fascia closed with 0 Vicryl.  Skin closed with 3-0 Monocryl, benzoin, and Steri-Strips.     Dictated By Anthony Trivedi MD  d: 06/17/2025 09:08:17  t: 06/17/2025 12:41:39  Job [id number]  GL/    cc: Dr. Adilia Rainey

## 2025-06-17 NOTE — INTERVAL H&P NOTE
Pre-op Diagnosis: Biliary colic [K80.50]    The above referenced H&P was reviewed by Anthony Trivedi MD on 6/17/2025, the patient was examined and no significant changes have occurred in the patient's condition since the H&P was performed.  I discussed with the patient and/or legal representative the potential benefits, risks and side effects of this procedure; the likelihood of the patient achieving goals; and potential problems that might occur during recuperation.  I discussed reasonable alternatives to the procedure, including risks, benefits and side effects related to the alternatives and risks related to not receiving this procedure.  We will proceed with procedure as planned.

## 2025-06-17 NOTE — ANESTHESIA PROCEDURE NOTES
Airway  Date/Time: 6/17/2025 8:34 AM  Reason: Elective      General Information and Staff   Patient location during procedure: OR  Anesthesiologist: Marty Funes MD  Resident/CRNA: Cecilia Mcclelland CRNA  Performed: CRNA   Performed by: Cecilia Mcclelland CRNA  Authorized by: Marty Funes MD        Indications and Patient Condition  Indications for airway management: anesthesia  Sedation level: deep      Preoxygenated: yesPatient position: sniffing    Mask difficulty assessment: 1 - vent by mask    Final Airway Details    Final airway type: endotracheal airway    Successful airway: ETT  Cuffed: yes   Successful intubation technique: direct laryngoscopy  Endotracheal tube insertion site: oral  Blade: Lloyd  Blade size: #2  ETT size (mm): 6.5    Cormack-Lehane Classification: grade I - full view of glottis  Placement verified by: capnometry   Cuff volume (mL): 8  Measured from: teeth  ETT to teeth (cm): 21  Number of attempts at approach: 1  Ventilation between attempts: BVM  Number of other approaches attempted: 0

## 2025-06-30 ENCOUNTER — OFFICE VISIT (OUTPATIENT)
Dept: SURGERY | Facility: CLINIC | Age: 25
End: 2025-06-30
Payer: COMMERCIAL

## 2025-06-30 VITALS — DIASTOLIC BLOOD PRESSURE: 57 MMHG | SYSTOLIC BLOOD PRESSURE: 95 MMHG | HEART RATE: 56 BPM

## 2025-06-30 DIAGNOSIS — Z48.89 ENCOUNTER FOR POSTOPERATIVE CARE: Primary | ICD-10-CM

## 2025-06-30 PROCEDURE — 99024 POSTOP FOLLOW-UP VISIT: CPT

## 2025-06-30 NOTE — PROGRESS NOTES
Follow Up Visit Note       Active Problems      1. Encounter for postoperative care          Chief Complaint   Chief Complaint   Patient presents with    Post-Op     Pt had  Laparoscopic cholecystectomy 6/17/2025, pt states to be doing good         History of Present Illness  Yina is a pleasant 24 year old year old patient presenting for post op appointment. She is s/p laparoscopic cholecystectomy with Dr. Trivedi on 6/17/25. She generally feels well, she denies abdominal pain. She is tolerating a general diet without diarrhea or constipation. She denies fever, chills, SOB, chest pain, leg swelling or calf tenderness.       Allergies  Yina is allergic to seasonal.    Past Medical / Surgical / Social / Family History    The past medical and past surgical history have been reviewed by me today.    Past Medical History[1]  Past Surgical History[2]    The family history and social history have been reviewed by me today.    Family History[3]  Social Hx on file[4]   Medications - Current[5]     Review of Systems  The Review of Systems has been reviewed by me during today.  Review of Systems   Constitutional:  Negative for chills, fatigue and fever.   Respiratory:  Negative for shortness of breath.    Cardiovascular:  Negative for chest pain and leg swelling.   Gastrointestinal:  Negative for abdominal pain, constipation, diarrhea, nausea and vomiting.        Physical Findings   BP 95/57 (BP Location: Right arm, Patient Position: Sitting, Cuff Size: adult)   Pulse 56   LMP  (LMP Unknown)   Physical Exam  Constitutional:       General: She is not in acute distress.     Appearance: She is not ill-appearing.   HENT:      Head: Normocephalic and atraumatic.   Eyes:      Extraocular Movements: Extraocular movements intact.      Conjunctiva/sclera: Conjunctivae normal.      Pupils: Pupils are equal, round, and reactive to light.   Abdominal:      General: Abdomen is flat. There is no distension.      Palpations: Abdomen is  soft.      Tenderness: There is no abdominal tenderness.   Skin:     General: Skin is warm.      Comments: incision C/D/I.   Neurological:      Mental Status: She is alert.          Assessment   1. Encounter for postoperative care      Pathology reviewed with the patient    Plan   Continue avoiding heavy lifting for another 2 weeks  Continue good hygiene of incision site  OK to swim or take a bath  Follow up as needed       No orders of the defined types were placed in this encounter.      Imaging & Referrals   None    Follow Up  No follow-ups on file.    NEYMAR Loja     Patient seen and examined by myself. 100% clinical time and 100% MDM was performed by me.        [1]   Past Medical History:   Allergic rhinitis    Anxiety    Asthma (HCC)    childhood    Bronchial cyst    Dysmenorrhea    Elevated LFTs    Gallstones    History of primary malignant neoplasm of right ovary    Long QT syndrome    Phenotype positive (multiple ECGs with QTc > 470 ms; see August 2018 for an example)  Genotype positive (SCN5A [id number] p.[id number], heterozygous, likely pathologic variant)  Started on beta blockers in August 2018    Lung cyst    Diagnosed at 3 year of age. Surgically resected at [facility]. Benign per report    Malignant neoplasm of right ovary (HCC)    Stage 1 - oopherectomy 9/17. Nodes clear. No chemo/radiation.  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 Will do Brain CT in future.  Next follow with [facility] Hematologist 4/18. 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 Omentum with chronic inflammation, negative for tumor. Pericaval lymph node x 1 negative for tumor. Peritoneal fluid negative for tumor.    Visual impairment    glasses    Wears glasses    driving, studying   [2]   Past Surgical History:  Procedure Laterality Date    Cholecystectomy N/A 06/17/2025    Laparoscopic cholecystectomy    Fine needle aspir,superficial Bilateral 04/05/2021    Bilateral thyroid nodules - benign follicular nodules    Oophorectomy Right 2017    Sertoli Leydig cell tumor right ovary    Other surgical history  2004    Removal bronchial cyst Per NG surgery at [facility] by Dr Lee   [3]   Family History  Problem Relation Age of Onset    Genetic Disease Mother         Carrier DICER1 mutation    Other (Prolonged QT syndrome) Mother     Thyroid disease Mother         Grave's disease    No Known Problems Father         Not much known about this side of family    Genetic Disease Sister         Carrier DICER1 mutation    Other (Prolonged QT syndrome) Sister     No Known Problems Sister     No Known Problems Brother     Diabetes Maternal Grandfather     Hypertension Maternal Grandfather     Prostate Cancer Maternal Grandfather     Stroke Maternal Grandfather     Ovarian Cancer Maternal Aunt 18    Thyroid Cancer Maternal Aunt         30s    Breast Cancer Other     Breast Cancer Other     Heart Disorder Neg     Lipids Neg     Obesity Neg     Psychiatric Neg     Colon Cancer Neg     Birth Defects Neg     Infertility Neg     Endometriosis Neg     Blood Clotting Disorder Neg    [4]   Social History  Socioeconomic History    Marital status: Single   Tobacco Use    Smoking status: Some Days     Types: Cigarettes    Smokeless tobacco: Never    Tobacco comments:     1 cig every few months. Rare.    Vaping Use    Vaping status: Never Used   Substance and Sexual Activity    Alcohol use: Not Currently     Comment: occasional, social situations.    Drug use: Never    Sexual activity: Yes     Partners:  Male     Birth control/protection: Condom   Other Topics Concern    Caffeine Concern No    Exercise No    Seat Belt No    Special Diet No    Stress Concern Yes     Comment: College student, and experienced 3 family deaths past 6 freddy    Weight Concern No   [5]   Current Outpatient Medications:     nadolol 20 MG Oral Tab, Take 3 tablets (60 mg total) by mouth every evening., Disp: , Rfl: 2    ibuprofen 600 MG Oral Tab, Take 1 tablet (600 mg total) by mouth every 8 (eight) hours as needed for Pain., Disp: 15 tablet, Rfl: 0    HYDROcodone-acetaminophen 5-325 MG Oral Tab, Take 1-2 tablets by mouth every 6 (six) hours as needed for Pain., Disp: 10 tablet, Rfl: 0    docusate sodium 100 MG Oral Cap, Take 1 capsule (100 mg total) by mouth 2 (two) times daily., Disp: 30 capsule, Rfl: 0    Drospirenone (SLYND) 4 MG Oral Tab, Take 1 tablet by mouth daily for 28 days., Disp: 84 tablet, Rfl: 2

## 2025-07-15 DIAGNOSIS — Z15.09 MONOALLELIC MUTATION OF DICER1 GENE: ICD-10-CM

## 2025-07-15 DIAGNOSIS — Z85.43: ICD-10-CM

## 2025-07-15 DIAGNOSIS — Z15.89 MONOALLELIC MUTATION OF DICER1 GENE: ICD-10-CM

## 2025-07-15 DIAGNOSIS — R79.89 ELEVATED LFTS: ICD-10-CM

## 2025-07-15 RX ORDER — DROSPIRENONE 4 MG/1
1 TABLET, FILM COATED ORAL DAILY
Qty: 84 TABLET | Refills: 1 | Status: SHIPPED | OUTPATIENT
Start: 2025-07-15 | End: 2025-08-12

## 2025-07-15 NOTE — TELEPHONE ENCOUNTER
Last WWE 3/1/24. Scheduled annual exam 12/10/25. Requesting a refill on Slynd. Will route for review. Order pended.

## 2025-08-12 ENCOUNTER — OFFICE VISIT (OUTPATIENT)
Dept: INTERNAL MEDICINE CLINIC | Facility: CLINIC | Age: 25
End: 2025-08-12

## 2025-08-12 VITALS
DIASTOLIC BLOOD PRESSURE: 60 MMHG | WEIGHT: 128.38 LBS | SYSTOLIC BLOOD PRESSURE: 100 MMHG | TEMPERATURE: 97 F | OXYGEN SATURATION: 99 % | HEART RATE: 61 BPM | HEIGHT: 62 IN | BODY MASS INDEX: 23.62 KG/M2

## 2025-08-12 DIAGNOSIS — Z00.00 ROUTINE GENERAL MEDICAL EXAMINATION AT A HEALTH CARE FACILITY: Primary | ICD-10-CM

## 2025-08-12 DIAGNOSIS — Z13.29 SCREENING FOR ENDOCRINE, NUTRITIONAL, METABOLIC AND IMMUNITY DISORDER: ICD-10-CM

## 2025-08-12 DIAGNOSIS — H00.012 HORDEOLUM EXTERNUM OF RIGHT LOWER EYELID: ICD-10-CM

## 2025-08-12 DIAGNOSIS — I45.81 LONG QT SYNDROME: ICD-10-CM

## 2025-08-12 DIAGNOSIS — Z90.49 S/P CHOLE: ICD-10-CM

## 2025-08-12 DIAGNOSIS — Z13.228 SCREENING FOR ENDOCRINE, NUTRITIONAL, METABOLIC AND IMMUNITY DISORDER: ICD-10-CM

## 2025-08-12 DIAGNOSIS — Z23 NEED FOR TDAP VACCINATION: ICD-10-CM

## 2025-08-12 DIAGNOSIS — R79.89 ELEVATED LFTS: ICD-10-CM

## 2025-08-12 DIAGNOSIS — Z13.0 SCREENING FOR DISORDER OF BLOOD AND BLOOD-FORMING ORGANS: ICD-10-CM

## 2025-08-12 DIAGNOSIS — Z13.29 SCREENING FOR THYROID DISORDER: ICD-10-CM

## 2025-08-12 DIAGNOSIS — Z13.21 SCREENING FOR ENDOCRINE, NUTRITIONAL, METABOLIC AND IMMUNITY DISORDER: ICD-10-CM

## 2025-08-12 DIAGNOSIS — Z85.43: ICD-10-CM

## 2025-08-12 DIAGNOSIS — Z13.220 SCREENING FOR LIPID DISORDERS: ICD-10-CM

## 2025-08-12 DIAGNOSIS — Z13.0 SCREENING FOR ENDOCRINE, NUTRITIONAL, METABOLIC AND IMMUNITY DISORDER: ICD-10-CM

## 2025-08-12 PROCEDURE — 90715 TDAP VACCINE 7 YRS/> IM: CPT | Performed by: INTERNAL MEDICINE

## 2025-08-12 PROCEDURE — 99395 PREV VISIT EST AGE 18-39: CPT | Performed by: INTERNAL MEDICINE

## 2025-08-12 PROCEDURE — 90471 IMMUNIZATION ADMIN: CPT | Performed by: INTERNAL MEDICINE

## 2025-08-16 ENCOUNTER — LAB ENCOUNTER (OUTPATIENT)
Dept: LAB | Age: 25
End: 2025-08-16
Attending: INTERNAL MEDICINE

## 2025-08-16 DIAGNOSIS — Z13.0 SCREENING FOR DISORDER OF BLOOD AND BLOOD-FORMING ORGANS: ICD-10-CM

## 2025-08-16 DIAGNOSIS — Z13.21 SCREENING FOR ENDOCRINE, NUTRITIONAL, METABOLIC AND IMMUNITY DISORDER: ICD-10-CM

## 2025-08-16 DIAGNOSIS — Z13.29 SCREENING FOR ENDOCRINE, NUTRITIONAL, METABOLIC AND IMMUNITY DISORDER: ICD-10-CM

## 2025-08-16 DIAGNOSIS — Z13.29 SCREENING FOR THYROID DISORDER: ICD-10-CM

## 2025-08-16 DIAGNOSIS — Z13.0 SCREENING FOR ENDOCRINE, NUTRITIONAL, METABOLIC AND IMMUNITY DISORDER: ICD-10-CM

## 2025-08-16 DIAGNOSIS — Z13.220 SCREENING FOR LIPID DISORDERS: ICD-10-CM

## 2025-08-16 DIAGNOSIS — Z13.228 SCREENING FOR ENDOCRINE, NUTRITIONAL, METABOLIC AND IMMUNITY DISORDER: ICD-10-CM

## 2025-08-16 LAB
ALBUMIN SERPL-MCNC: 4.5 G/DL (ref 3.2–4.8)
ALBUMIN/GLOB SERPL: 1.8 (ref 1–2)
ALP LIVER SERPL-CCNC: 72 U/L (ref 52–144)
ALT SERPL-CCNC: 54 U/L (ref 10–49)
ANION GAP SERPL CALC-SCNC: 11 MMOL/L (ref 0–18)
AST SERPL-CCNC: 54 U/L (ref ?–34)
BASOPHILS # BLD AUTO: 0.06 X10(3) UL (ref 0–0.2)
BASOPHILS NFR BLD AUTO: 0.8 %
BILIRUB SERPL-MCNC: 0.5 MG/DL (ref 0.3–1.2)
BUN BLD-MCNC: 9 MG/DL (ref 9–23)
BUN/CREAT SERPL: 11.5 (ref 10–20)
CALCIUM BLD-MCNC: 9.5 MG/DL (ref 8.7–10.4)
CHLORIDE SERPL-SCNC: 103 MMOL/L (ref 98–112)
CHOLEST SERPL-MCNC: 126 MG/DL (ref ?–200)
CO2 SERPL-SCNC: 25 MMOL/L (ref 21–32)
CREAT BLD-MCNC: 0.78 MG/DL (ref 0.55–1.02)
DEPRECATED RDW RBC AUTO: 42.2 FL (ref 35.1–46.3)
EGFRCR SERPLBLD CKD-EPI 2021: 110 ML/MIN/1.73M2 (ref 60–?)
EOSINOPHIL # BLD AUTO: 0.32 X10(3) UL (ref 0–0.7)
EOSINOPHIL NFR BLD AUTO: 4.2 %
ERYTHROCYTE [DISTWIDTH] IN BLOOD BY AUTOMATED COUNT: 12.3 % (ref 11–15)
FASTING PATIENT LIPID ANSWER: NO
FASTING STATUS PATIENT QL REPORTED: NO
GLOBULIN PLAS-MCNC: 2.5 G/DL (ref 2–3.5)
GLUCOSE BLD-MCNC: 68 MG/DL (ref 70–99)
HCT VFR BLD AUTO: 43 % (ref 35–48)
HDLC SERPL-MCNC: 62 MG/DL (ref 40–59)
HGB BLD-MCNC: 14.7 G/DL (ref 12–16)
IMM GRANULOCYTES # BLD AUTO: 0.02 X10(3) UL (ref 0–1)
IMM GRANULOCYTES NFR BLD: 0.3 %
LDLC SERPL CALC-MCNC: 37 MG/DL (ref ?–100)
LYMPHOCYTES # BLD AUTO: 2.04 X10(3) UL (ref 1–4)
LYMPHOCYTES NFR BLD AUTO: 27 %
MCH RBC QN AUTO: 31.7 PG (ref 26–34)
MCHC RBC AUTO-ENTMCNC: 34.2 G/DL (ref 31–37)
MCV RBC AUTO: 92.9 FL (ref 80–100)
MONOCYTES # BLD AUTO: 0.57 X10(3) UL (ref 0.1–1)
MONOCYTES NFR BLD AUTO: 7.5 %
NEUTROPHILS # BLD AUTO: 4.54 X10 (3) UL (ref 1.5–7.7)
NEUTROPHILS # BLD AUTO: 4.54 X10(3) UL (ref 1.5–7.7)
NEUTROPHILS NFR BLD AUTO: 60.2 %
NONHDLC SERPL-MCNC: 64 MG/DL (ref ?–130)
OSMOLALITY SERPL CALC.SUM OF ELEC: 285 MOSM/KG (ref 275–295)
PLATELET # BLD AUTO: 300 10(3)UL (ref 150–450)
POTASSIUM SERPL-SCNC: 4 MMOL/L (ref 3.5–5.1)
PROT SERPL-MCNC: 7 G/DL (ref 5.7–8.2)
RBC # BLD AUTO: 4.63 X10(6)UL (ref 3.8–5.3)
SODIUM SERPL-SCNC: 139 MMOL/L (ref 136–145)
TRIGL SERPL-MCNC: 165 MG/DL (ref 30–149)
TSI SER-ACNC: 1.01 UIU/ML (ref 0.55–4.78)
VLDLC SERPL CALC-MCNC: 23 MG/DL (ref 0–30)
WBC # BLD AUTO: 7.6 X10(3) UL (ref 4–11)

## 2025-08-16 PROCEDURE — 36415 COLL VENOUS BLD VENIPUNCTURE: CPT

## 2025-08-16 PROCEDURE — 80061 LIPID PANEL: CPT

## 2025-08-16 PROCEDURE — 80053 COMPREHEN METABOLIC PANEL: CPT

## 2025-08-16 PROCEDURE — 85025 COMPLETE CBC W/AUTO DIFF WBC: CPT

## 2025-08-16 PROCEDURE — 84443 ASSAY THYROID STIM HORMONE: CPT

## 2025-08-17 ENCOUNTER — TELEPHONE (OUTPATIENT)
Dept: INTERNAL MEDICINE CLINIC | Facility: CLINIC | Age: 25
End: 2025-08-17

## 2025-08-17 DIAGNOSIS — R74.01 TRANSAMINITIS: Primary | ICD-10-CM

## (undated) DEVICE — 3M™ TEGADERM™ HP TRANSPARENT FILM DRESSING FRAME STYLE, 9534HP, 2-3/8 X 2-3/4 IN (6 CM X 7 CM), 100/CT 4CT/CASE: Brand: 3M™ TEGADERM™

## (undated) DEVICE — TROCARS: Brand: KII® BLUNT TIP ACCESS SYSTEM

## (undated) DEVICE — [HIGH FLOW INSUFFLATOR,  DO NOT USE IF PACKAGE IS DAMAGED,  KEEP DRY,  KEEP AWAY FROM SUNLIGHT,  PROTECT FROM HEAT AND RADIOACTIVE SOURCES.]: Brand: PNEUMOSURE

## (undated) DEVICE — SOLUTION IRRIG 1000ML 0.9% NACL USP BTL

## (undated) DEVICE — GLOVE SUR 7 SENSICARE PI MIC PIP CRM PWD F

## (undated) DEVICE — SOLUTION IV 1000ML 0.9% NACL PRESERVATIVE

## (undated) DEVICE — GAUZE SPONGES,8 PLY: Brand: CURITY

## (undated) DEVICE — GLOVE SUR 8 SENSICARE PI PIP GRN PWD F

## (undated) DEVICE — GLOVE SUR 8 SENSICARE PI PIP CRM PWD F

## (undated) DEVICE — CLIP APPLIER: Brand: ENDO CLIP II

## (undated) DEVICE — TROCAR: Brand: KII FIOS FIRST ENTRY

## (undated) DEVICE — Device: Brand: JELCO

## (undated) DEVICE — INSUFFLATION NEEDLE TO ESTABLISH PNEUMOPERITONEUM.: Brand: INSUFFLATION NEEDLE

## (undated) DEVICE — TISSUE RETRIEVAL SYSTEM: Brand: INZII RETRIEVAL SYSTEM

## (undated) DEVICE — LAP CHOLE: Brand: MEDLINE INDUSTRIES, INC.

## (undated) DEVICE — Device: Brand: SUTURE PASSOR PRO

## (undated) DEVICE — SUT COAT VCRL + 0 54IN ABSRB UD ANTIBACT

## (undated) DEVICE — SUT MCRYL 3-0 18IN PS-2 ABSRB UD 19MM 3/8 CIR

## (undated) DEVICE — TROCAR: Brand: KII® SLEEVE

## (undated) DEVICE — TROCAR: Brand: KII SHIELDED BLADED ACCESS SYSTEM

## (undated) NOTE — Clinical Note
[facility] [employer] [place] [employer] of Child Health Examination       Student's Name  [facility] Frenando Title                           Date    (If adding dates to the above immunization history section, put your initials by date(s) and sign here.)   ALTERNATIVE PROOF OF IMMUNITY   1 Diagnosis of asthma? Child wakes during the night coughing   Yes   No    Yes   No    Loss of function of one of paired organs? (eye/ear/kidney/testicle)   Yes   No      Birth Defects? Developmental delay? Yes   No    Yes   No  Hospitalizations? When? polycystic ovarian syndrome, acanthosis nigricans)                           At Risk     Lead Risk Questionnaire  Req'd for children 6 months thru 6 yrs enrolled in licensed or public school operated day care, ,  nursery school and/or  SPECIAL INSTRUCTIONS/DEVICES e.g. safety glasses, glass eye, chest protector for arrhythmia, pacemaker, prosthetic device, dental bridge, false teeth, athleticsupport/cup     None   MENTAL HEALTH/OTHER   Is there anything else the school should know about

## (undated) NOTE — LETTER
VACCINE ADMINISTRATION RECORD  PARENT / GUARDIAN APPROVAL  Date: 10/15/2020  Vaccine administered to: Mariposa Bond     : 2000    MRN: [id number]    A copy of the appropriate Centers for Disease Control and Prevention Vaccine Information statemen

## (undated) NOTE — LETTER
ASTHMA ACTION PLAN for Terence Craft     : 2000     Date: 07/15/19  Doctor:  Tyson Calvin DO  Phone for doctor or clinic: [address] [place] [address]  [phone] Albuterol inhaler 2 puffs every 20 minutes for three treatments       Don't forget:  · Rinse mouth after using inhaler  · Use spacer for inhaler  · Remember to get your Flu vaccine every fall!     [x] Asthma Action Plan reviewed with the caregiver and gulshan

## (undated) NOTE — LETTER
State [facility] [employer] [place] [employer] of Child Health Examination       Student's Name  [facility] Fernnado Title                           Date     Signature HEALTH HISTORY          TO BE COMPLETED AND SIGNED BY PARENT/GUARDIAN AND VERIFIED BY HEALTH CARE PROVIDER    ALLERGIES  (Food, drug, insect, other)  Seasonal MEDICATION  (List all prescribed or taken on a regular basis.)    Current Outpatient Prescription Other concerns? Ear/Hearing problems? Yes   No  Information may be shared with appropriate personnel for health /educational purposes. Bone/Joint problem/injury/scoliosis?    Yes   No  Parent/Guardian Signature SYSTEM REVIEW Normal Comments/Follow-up/Needs  Normal Comments/Follow-up/Needs   Skin Yes  Endocrine Yes    Ears Yes                      Screen result: Gastrointestinal Yes    Eyes Yes     Screen result:   Genito-Urinary Yes  LMP   Nose Yes  Neurological [place], [address] [facility]   [phone]   Rev 11/15                                                                    Printed by the [employer]

## (undated) NOTE — LETTER
Name:  Shreya Jerry Year:  12th Grade Class: Student ID No.:   Address:  [address] [place] [address] Phone:  [phone] (home) [phone] (work) :  18year old   Name Relationship [address] Work Phone Home Phone Mobile Phone 15. Does anyone in your family have hypertrophic cardiomyopathy, Marfan syndrome, arrhythmogenic right ventricular cardiomyopathy, long QT syndrome, short QT syndrome, Brugada syndrome, or catecholaminergic polymorphic ventricular tachycardia?      15. Does 35. Have you ever had a hit or blow to the head that caused confusion, prolonged headache, or memory problems? 36. Do you have a history of seizure disorder?     37. Do you have headaches with exercise?      38. Have you ever had numbness, tingling, or Appearance:  Marfan stigmata (kyphoscoliosis, high-arched palate, pectus excavatum,      arachnodactyly, arm span > height, hyperlaxity, myopia, MVP, aortic insufficiency) Yes    Eyes/Ears/Nose/Throat:  Pupils equal    Hearing Yes    Lymph nodes Yes    Hea As a prerequisite to participation in [employer] athletic activities, we agree that I/our student will not use performance-enhancing substances as defined in the [facility] Performance-Enhancing Substance Testing Program Protocol.  We have reviewed the policy and under

## (undated) NOTE — LETTER
[address] [place] [address]  Dept: [phone]  Dept Fax: [phone]  Loc: [phone]         October 10, 2018    Patient: Kareem Krishnan   YOB: 2000   Date of Visit: 10/10/2018

## (undated) NOTE — LETTER
[facility] [employer] [place] [employer] of Child Health Examination       Student's Name  Michael Guerrier Da Title         MD                  Date  10/15/2020   Signature                                                                                                                                              Title                           Date    (If HEALTH HISTORY          TO BE COMPLETED AND SIGNED BY PARENT/GUARDIAN AND VERIFIED BY HEALTH CARE PROVIDER    ALLERGIES  (Food, drug, insect, other)  Seasonal MEDICATION  (List all prescribed or taken on a regular basis.)  Current Outpatient Medications: PHYSICAL EXAMINATION REQUIREMENTS (head circumference if <33 years old):   BP (!) 84/50   Pulse 67   Ht 5' 2.5\" (1.588 m)   Wt 59.4 kg (131 lb)   BMI 23.58 kg/m²     DIABETES SCREENING  BMI>85% age/sex  No And any two of the following:  Family History Daioneyda Blunt Respiratory Yes                   Diagnosis of Asthma: No Mental Health Yes        Currently Prescribed Asthma Medication:            Quick-relief  medication (e.g. Short Acting Beta Antagonist): No          Controller medication (e.g. inhaled corticostero

## (undated) NOTE — LETTER
Name:  Lillian Rankin Year:  12th Grade Class: Student ID No.:   Address:  [address] [place] [address] Phone:  [phone] (home) [phone] (work) :  14year old   Name Relationship [address] Work Phone Home Phone Mobile Phone unexpected or unexplained sudden death before age 48?     15. Does anyone in your family have hypertrophic cardiomyopathy, Marfan syndrome, arrhythmogenic right ventricular cardiomyopathy, long QT syndrome, short QT syndrome, Brugada syndrome, or catechola 29. Have you ever had a head injury or concussion? 35. Have you ever had a hit or blow to the head that caused confusion, prolonged headache, or memory problems? 36. Do you have a history of seizure disorder?     37.  Do you have headaches with exer Vision: R 20/    L 20/   Corrected:   Yes/No   MEDICAL NORMAL ABNORMAL FINDINGS   Appearance:  Marfan stigmata (kyphoscoliosis, high-arched palate, pectus excavatum,      arachnodactyly, arm span > height, hyperlaxity, myopia, MVP, aortic insufficiency) [place] (This section for high school students only)   7273-7101 school term     As a prerequisite to participation in [employer] athletic activities, we agree that I/our student will not use performance-enhancing substances as defined in the [employer] Performance-Enhancing

## (undated) NOTE — LETTER
VACCINE ADMINISTRATION RECORD  PARENT / GUARDIAN APPROVAL  Date: 2018  Vaccine administered to: Mariposa Bond     : 2000    MRN: [id number]    A copy of the appropriate Centers for Disease Control and Prevention Vaccine Information statement

## (undated) NOTE — Clinical Note
State [facility] [employer] [employer] of Child Health Examination       Student's Name  [facility] Fernando Title                           Date    (If adding dates to the above immunization history section, put your initials by date(s) and sign here.)   ALTERNATIVE PROOF OF IMMUNITY   1 Diagnosis of asthma? Child wakes during the night coughing   Yes   No    Yes   No    Loss of function of one of paired organs? (eye/ear/kidney/testicle)   Yes   No      Birth Defects? Developmental delay? Yes   No    Yes   No  Hospitalizations? When? Signs of Insulin Resistance (hypertension, dyslipidemia, polycystic ovarian syndrome, acanthosis nigricans)    No                       At Risk  No   Lead Risk Questionnaire  Req'd for children 6 months thru 6 yrs enrolled in licensed or public sc Needs/Restrictions     None   SPECIAL INSTRUCTIONS/DEVICES e.g. safety glasses, glass eye, chest protector for arrhythmia, pacemaker, prosthetic device, dental bridge, false teeth, athleticsupport/cup     None   MENTAL HEALTH/OTHER   Is there anything else

## (undated) NOTE — Clinical Note
VACCINE ADMINISTRATION RECORD  PARENT / GUARDIAN APPROVAL  Date: 2/15/2017  Vaccine administered to: Jay Lockhart     : 2000    MRN: [id number]    A copy of the appropriate Centers for Disease Control and Prevention Vaccine Information statement

## (undated) NOTE — LETTER
Lenda Pack  [address], [address] [facility],[address]       08/23/19        Patient: Wilber Wilkes   YOB: 2000   Date of Visit: 8/22/2019       Dear  Dr. Annabel Jacobson, DO,      Thank you for

## (undated) NOTE — LETTER
[facility] [employer] of Child Health Examination       Student's Name  Jayne Guerrier Da Title      DO                     Date   7/15/2019   Signature                                                                                                                                              Title                           Date    (I VERIFIED BY HEALTH CARE PROVIDER    ALLERGIES  (Food, drug, insect, other)  Seasonal MEDICATION  (List all prescribed or taken on a regular basis.)     Diagnosis of asthma?   Child wakes during the night coughing   Yes   No    Yes   No    Loss of function o Family History Yes    Ethnic Minority  Yes          Signs of Insulin Resistance (hypertension, dyslipidemia, polycystic ovarian syndrome, acanthosis nigricans)    No           At Risk  No   Lead Risk Questionnaire  Req'd for children 6 months thru 6 yrs en Controller medication (e.g. inhaled corticosteroid):   No Other   NEEDS/MODIFICATIONS required in the school setting  None DIETARY Needs/Restrictions     None   SPECIAL INSTRUCTIONS/DEVICES e.g. safety glasses, glass eye, chest protector for arrhyt

## (undated) NOTE — LETTER
ASTHMA ACTION PLAN for [place]     : 2000     Date: 18  Doctor:  Margaret Davidson DO  Phone for doctor or clinic: Doug Burrell , [address] [facility], [address] [facility] [address], [address]            A Albuterol inhaler 2 puffs every 20 minutes for three treatments       Don't forget:  · Rinse mouth after using inhaler  · Use spacer for inhaler  · Remember to get your Flu vaccine every fall!     [x] Asthma Action Plan reviewed with the caregiver and gulshan

## (undated) NOTE — Clinical Note
State [facility] [employer] [employer] of Child Health Examination       Student's Name  [facility] Fernando Title                           Date    (If adding dates to the above immunization history section, put your initials by date(s) and sign here.)   ALTERNATIVE PROOF OF IMMUNITY   1 Diagnosis of asthma? Child wakes during the night coughing   Yes       No    Yes       No    Loss of function of one of paired organs? (eye/ear/kidney/testicle)   Yes       No      Birth Defects? Developmental delay?    Yes       No    Yes       No  Hospi following:  Family History                    Ethnic Minority                             Signs of Insulin Resistance (hypertension, dyslipidemia, polycystic ovarian syndrome, acanthosis nigricans)                           At Risk     Lead Risk [place] Cardiovascular/HTN {YES:829::\"Yes\"}  Nutritional status {YES:829::\"Yes\"}    Respiratory {YES:829::\"Yes\"}                   Diagnosis of Asthma: {NO:830::\"No\"} Mental Health {YES:829::\"Yes\"}        Currently Prescribed Asthma Medication: Rev 12/15                                                                    Printed by the [employer]

## (undated) NOTE — MR AVS SNAPSHOT
[facility]  [facility], [address]  [phone]               Thank you for choosing us for your health care visit with Laura Hillman DO.   We are glad to serve you and happy to provide you with this summ · Life at home. How is your child’s behavior? Does he or she get along with others in the family? Is he or she respectful of you, other adults, and authority?  Does your child participate in family events, or does he or she withdraw from other family member This time can be broken up throughout the day. After-school sports, dance or martial arts classes, riding a bike, or even walking to school or a friend’s house counts as activity.    · Limit “screen time” to 1 hour to 2 hours each day.  This includes time s Sleeping tips  During the teen years, sleep patterns may change. Many teenagers have a hard time falling asleep, which can lead to sleeping late the next morning.  Here are some tips to help your teen get the rest he or she needs:  · Encourage your teen to · Set rules and limits around driving and use of the car. If your teen gets a ticket or has an accident, there should be consequences. Driving is a privilege that can be taken away if your child doesn’t follow the rules.   · Teach your child to make good de 56074. All rights reserved. This information is not intended as a substitute for professional medical care. Always follow your healthcare professional's instructions.              Follow Up with Our Office     Return in 1 year (on 2/15/2018), or if symptoms improve, for if symptoms worsen or fail to improve.          Results of Recent Testing     ASSAY, THYROID STIM HORMONE      Component Value Standard Range & Units    TSH 0.67 0.34-5.60 uIU/mL                FREE T4 (FREE THYROXINE)      Component Value Shasha Mcghee